# Patient Record
Sex: FEMALE | Race: BLACK OR AFRICAN AMERICAN | Employment: OTHER
[De-identification: names, ages, dates, MRNs, and addresses within clinical notes are randomized per-mention and may not be internally consistent; named-entity substitution may affect disease eponyms.]

---

## 2017-01-01 DIAGNOSIS — I10 ESSENTIAL HYPERTENSION: ICD-10-CM

## 2017-01-01 RX ORDER — FERROUS SULFATE 325(65) MG
TABLET ORAL
Qty: 60 TABLET | Refills: 0 | Status: SHIPPED | OUTPATIENT
Start: 2017-01-01 | End: 2018-01-01 | Stop reason: SDUPTHER

## 2017-01-01 RX ORDER — AMLODIPINE BESYLATE 5 MG/1
TABLET ORAL
Qty: 30 TABLET | Refills: 0 | Status: SHIPPED | OUTPATIENT
Start: 2017-01-01 | End: 2018-01-01 | Stop reason: SDUPTHER

## 2017-01-13 DIAGNOSIS — I25.119 CORONARY ARTERY DISEASE WITH ANGINA PECTORIS, UNSPECIFIED VESSEL OR LESION TYPE, UNSPECIFIED WHETHER NATIVE OR TRANSPLANTED HEART (HCC): ICD-10-CM

## 2017-01-13 DIAGNOSIS — I10 ESSENTIAL HYPERTENSION: ICD-10-CM

## 2017-01-13 RX ORDER — CLOPIDOGREL BISULFATE 75 MG/1
TABLET ORAL
Qty: 30 TABLET | Refills: 5 | Status: CANCELLED | OUTPATIENT
Start: 2017-01-13

## 2017-01-13 RX ORDER — ASPIRIN 81 MG/1
TABLET ORAL
Qty: 30 TABLET | Refills: 11 | Status: CANCELLED | OUTPATIENT
Start: 2017-01-13

## 2017-01-13 RX ORDER — METOPROLOL TARTRATE 100 MG/1
TABLET ORAL
Qty: 30 TABLET | Refills: 11 | Status: CANCELLED | OUTPATIENT
Start: 2017-01-13

## 2017-02-15 DIAGNOSIS — I25.119 CORONARY ARTERY DISEASE WITH ANGINA PECTORIS, UNSPECIFIED VESSEL OR LESION TYPE, UNSPECIFIED WHETHER NATIVE OR TRANSPLANTED HEART (HCC): ICD-10-CM

## 2017-02-15 DIAGNOSIS — I10 ESSENTIAL HYPERTENSION: ICD-10-CM

## 2017-02-16 RX ORDER — METOPROLOL TARTRATE 100 MG/1
TABLET ORAL
Qty: 30 TABLET | Refills: 11 | Status: SHIPPED | OUTPATIENT
Start: 2017-02-16 | End: 2017-02-20 | Stop reason: SDUPTHER

## 2017-02-20 ENCOUNTER — OFFICE VISIT (OUTPATIENT)
Dept: FAMILY MEDICINE CLINIC | Facility: CLINIC | Age: 67
End: 2017-02-20

## 2017-02-20 VITALS
HEART RATE: 70 BPM | HEIGHT: 66 IN | TEMPERATURE: 97 F | WEIGHT: 209 LBS | SYSTOLIC BLOOD PRESSURE: 110 MMHG | BODY MASS INDEX: 33.59 KG/M2 | DIASTOLIC BLOOD PRESSURE: 70 MMHG

## 2017-02-20 DIAGNOSIS — E66.9 NON MORBID OBESITY, UNSPECIFIED OBESITY TYPE: Primary | ICD-10-CM

## 2017-02-20 DIAGNOSIS — I10 ESSENTIAL HYPERTENSION: ICD-10-CM

## 2017-02-20 DIAGNOSIS — I25.119 CORONARY ARTERY DISEASE WITH ANGINA PECTORIS, UNSPECIFIED VESSEL OR LESION TYPE, UNSPECIFIED WHETHER NATIVE OR TRANSPLANTED HEART (HCC): ICD-10-CM

## 2017-02-20 PROCEDURE — 99213 OFFICE O/P EST LOW 20 MIN: CPT | Performed by: HOSPITALIST

## 2017-02-20 RX ORDER — AMLODIPINE BESYLATE 5 MG/1
5 TABLET ORAL DAILY
Qty: 30 TABLET | Refills: 3 | Status: SHIPPED | OUTPATIENT
Start: 2017-02-20 | End: 2017-06-19 | Stop reason: SDUPTHER

## 2017-02-20 RX ORDER — METOPROLOL TARTRATE 100 MG/1
TABLET ORAL
Qty: 30 TABLET | Refills: 11 | Status: SHIPPED | OUTPATIENT
Start: 2017-02-20 | End: 2018-01-01

## 2017-02-20 RX ORDER — PANTOPRAZOLE SODIUM 20 MG/1
TABLET, DELAYED RELEASE ORAL
Qty: 30 TABLET | Refills: 3 | Status: SHIPPED | OUTPATIENT
Start: 2017-02-20 | End: 2017-03-14 | Stop reason: SDUPTHER

## 2017-02-20 ASSESSMENT — ENCOUNTER SYMPTOMS
SHORTNESS OF BREATH: 0
ANAL BLEEDING: 0
APNEA: 0
BLURRED VISION: 0
ABDOMINAL PAIN: 0
ABDOMINAL DISTENTION: 0
WHEEZING: 0

## 2017-03-14 RX ORDER — PANTOPRAZOLE SODIUM 20 MG/1
TABLET, DELAYED RELEASE ORAL
Qty: 30 TABLET | Refills: 0 | Status: SHIPPED | OUTPATIENT
Start: 2017-03-14 | End: 2017-03-24 | Stop reason: SDUPTHER

## 2017-03-23 ENCOUNTER — TELEPHONE (OUTPATIENT)
Dept: FAMILY MEDICINE CLINIC | Age: 67
End: 2017-03-23

## 2017-03-24 RX ORDER — PANTOPRAZOLE SODIUM 20 MG/1
TABLET, DELAYED RELEASE ORAL
Qty: 30 TABLET | Refills: 0 | Status: SHIPPED | OUTPATIENT
Start: 2017-03-24 | End: 2017-07-27 | Stop reason: SDUPTHER

## 2017-04-10 DIAGNOSIS — I10 ESSENTIAL HYPERTENSION: ICD-10-CM

## 2017-04-10 DIAGNOSIS — E11.9 TYPE 2 DIABETES MELLITUS WITHOUT COMPLICATION, UNSPECIFIED LONG TERM INSULIN USE STATUS: ICD-10-CM

## 2017-04-11 RX ORDER — ATORVASTATIN CALCIUM 40 MG/1
40 TABLET, FILM COATED ORAL NIGHTLY
Qty: 30 TABLET | Refills: 3 | Status: SHIPPED | OUTPATIENT
Start: 2017-04-11 | End: 2017-08-02 | Stop reason: SDUPTHER

## 2017-07-19 RX ORDER — DIPHENOXYLATE HYDROCHLORIDE AND ATROPINE SULFATE 2.5; .025 MG/1; MG/1
TABLET ORAL
Qty: 90 TABLET | Refills: 0 | Status: SHIPPED | OUTPATIENT
Start: 2017-07-19 | End: 2017-10-06 | Stop reason: SDUPTHER

## 2017-08-02 DIAGNOSIS — E11.9 TYPE 2 DIABETES MELLITUS WITHOUT COMPLICATION, UNSPECIFIED LONG TERM INSULIN USE STATUS: ICD-10-CM

## 2017-08-02 DIAGNOSIS — I10 ESSENTIAL HYPERTENSION: ICD-10-CM

## 2017-08-02 RX ORDER — ATORVASTATIN CALCIUM 40 MG/1
TABLET, FILM COATED ORAL
Qty: 30 TABLET | Refills: 0 | Status: SHIPPED | OUTPATIENT
Start: 2017-08-02 | End: 2017-09-06 | Stop reason: SDUPTHER

## 2017-08-16 RX ORDER — PANTOPRAZOLE SODIUM 20 MG/1
TABLET, DELAYED RELEASE ORAL
Qty: 30 TABLET | Refills: 0 | Status: CANCELLED | OUTPATIENT
Start: 2017-08-16

## 2017-08-17 RX ORDER — PANTOPRAZOLE SODIUM 20 MG/1
TABLET, DELAYED RELEASE ORAL
Qty: 30 TABLET | Refills: 0 | Status: CANCELLED | OUTPATIENT
Start: 2017-08-17

## 2017-08-17 RX ORDER — NICOTINE POLACRILEX 4 MG/1
20 GUM, CHEWING ORAL DAILY
Qty: 30 TABLET | Refills: 1 | Status: SHIPPED | OUTPATIENT
Start: 2017-08-17 | End: 2017-10-30 | Stop reason: SDUPTHER

## 2017-08-17 RX ORDER — OMEPRAZOLE 20 MG/1
20 TABLET, DELAYED RELEASE ORAL DAILY
Qty: 30 TABLET | Refills: 1 | Status: SHIPPED | OUTPATIENT
Start: 2017-08-17 | End: 2017-10-13 | Stop reason: SDUPTHER

## 2017-08-25 ENCOUNTER — OFFICE VISIT (OUTPATIENT)
Dept: FAMILY MEDICINE CLINIC | Age: 67
End: 2017-08-25
Payer: COMMERCIAL

## 2017-08-25 VITALS
SYSTOLIC BLOOD PRESSURE: 125 MMHG | DIASTOLIC BLOOD PRESSURE: 73 MMHG | HEART RATE: 68 BPM | HEIGHT: 66 IN | TEMPERATURE: 97.7 F | WEIGHT: 213.2 LBS | BODY MASS INDEX: 34.27 KG/M2

## 2017-08-25 DIAGNOSIS — I10 ESSENTIAL HYPERTENSION: ICD-10-CM

## 2017-08-25 DIAGNOSIS — E11.9 TYPE 2 DIABETES MELLITUS WITHOUT COMPLICATION, WITHOUT LONG-TERM CURRENT USE OF INSULIN (HCC): Primary | ICD-10-CM

## 2017-08-25 DIAGNOSIS — Z00.00 HEALTHCARE MAINTENANCE: ICD-10-CM

## 2017-08-25 DIAGNOSIS — Z23 ENCOUNTER FOR IMMUNIZATION: ICD-10-CM

## 2017-08-25 PROCEDURE — 90670 PCV13 VACCINE IM: CPT | Performed by: HOSPITALIST

## 2017-08-25 PROCEDURE — 90715 TDAP VACCINE 7 YRS/> IM: CPT | Performed by: HOSPITALIST

## 2017-08-25 PROCEDURE — G0009 ADMIN PNEUMOCOCCAL VACCINE: HCPCS | Performed by: HOSPITALIST

## 2017-08-25 PROCEDURE — 99213 OFFICE O/P EST LOW 20 MIN: CPT | Performed by: HOSPITALIST

## 2017-08-25 ASSESSMENT — PATIENT HEALTH QUESTIONNAIRE - PHQ9
1. LITTLE INTEREST OR PLEASURE IN DOING THINGS: 1
SUM OF ALL RESPONSES TO PHQ QUESTIONS 1-9: 2
2. FEELING DOWN, DEPRESSED OR HOPELESS: 1
SUM OF ALL RESPONSES TO PHQ9 QUESTIONS 1 & 2: 2

## 2017-08-25 ASSESSMENT — ENCOUNTER SYMPTOMS
GASTROINTESTINAL NEGATIVE: 1
RESPIRATORY NEGATIVE: 1

## 2017-09-06 DIAGNOSIS — E11.9 TYPE 2 DIABETES MELLITUS WITHOUT COMPLICATION, UNSPECIFIED LONG TERM INSULIN USE STATUS: ICD-10-CM

## 2017-09-06 DIAGNOSIS — I10 ESSENTIAL HYPERTENSION: ICD-10-CM

## 2017-09-07 RX ORDER — ATORVASTATIN CALCIUM 40 MG/1
TABLET, FILM COATED ORAL
Qty: 30 TABLET | Refills: 0 | Status: SHIPPED | OUTPATIENT
Start: 2017-09-07 | End: 2017-10-06 | Stop reason: SDUPTHER

## 2017-09-12 RX ORDER — CLOPIDOGREL BISULFATE 75 MG/1
TABLET ORAL
Qty: 30 TABLET | Refills: 0 | Status: SHIPPED | OUTPATIENT
Start: 2017-09-12 | End: 2017-10-06 | Stop reason: SDUPTHER

## 2017-09-12 RX ORDER — ASPIRIN 81 MG
TABLET, DELAYED RELEASE (ENTERIC COATED) ORAL
Qty: 30 TABLET | Refills: 0 | Status: SHIPPED | OUTPATIENT
Start: 2017-09-12 | End: 2017-10-13 | Stop reason: SDUPTHER

## 2017-09-14 RX ORDER — PANTOPRAZOLE SODIUM 20 MG/1
TABLET, DELAYED RELEASE ORAL
Qty: 30 TABLET | Refills: 0 | Status: CANCELLED | OUTPATIENT
Start: 2017-09-14

## 2017-09-20 ENCOUNTER — HOSPITAL ENCOUNTER (OUTPATIENT)
Age: 67
Discharge: HOME | End: 2017-09-20
Payer: COMMERCIAL

## 2017-09-20 LAB
CHOLESTEROL, FASTING: 156 MG/DL
CHOLESTEROL/HDL RATIO: 3.4
HDLC SERPL-MCNC: 46 MG/DL
LDL CHOLESTEROL: 72 MG/DL (ref 0–130)
TRIGLYCERIDE, FASTING: 189 MG/DL
VLDLC SERPL CALC-MCNC: ABNORMAL MG/DL (ref 1–30)

## 2017-09-20 PROCEDURE — 80061 LIPID PANEL: CPT

## 2017-09-20 PROCEDURE — 36415 COLL VENOUS BLD VENIPUNCTURE: CPT

## 2017-09-20 RX ORDER — LANSOPRAZOLE 15 MG/1
15 CAPSULE, DELAYED RELEASE ORAL DAILY
Qty: 30 CAPSULE | Refills: 3 | Status: SHIPPED | OUTPATIENT
Start: 2017-09-20 | End: 2017-10-30 | Stop reason: DRUGHIGH

## 2017-10-06 DIAGNOSIS — I10 ESSENTIAL HYPERTENSION: ICD-10-CM

## 2017-10-06 DIAGNOSIS — E11.9 TYPE 2 DIABETES MELLITUS WITHOUT COMPLICATION, UNSPECIFIED LONG TERM INSULIN USE STATUS: ICD-10-CM

## 2017-10-09 RX ORDER — CLOPIDOGREL BISULFATE 75 MG/1
TABLET ORAL
Qty: 30 TABLET | Refills: 0 | Status: SHIPPED | OUTPATIENT
Start: 2017-10-09 | End: 2017-10-30 | Stop reason: SDUPTHER

## 2017-10-09 RX ORDER — MULTIVITAMIN WITH FOLIC ACID 400 MCG
TABLET ORAL
Qty: 90 TABLET | Refills: 0 | Status: SHIPPED | OUTPATIENT
Start: 2017-10-09 | End: 2017-10-30 | Stop reason: SDUPTHER

## 2017-10-09 RX ORDER — ATORVASTATIN CALCIUM 40 MG/1
TABLET, FILM COATED ORAL
Qty: 30 TABLET | Refills: 0 | Status: SHIPPED | OUTPATIENT
Start: 2017-10-09 | End: 2017-10-30 | Stop reason: SDUPTHER

## 2017-10-09 RX ORDER — LISINOPRIL 5 MG/1
TABLET ORAL
Qty: 60 TABLET | Refills: 0 | Status: SHIPPED | OUTPATIENT
Start: 2017-10-09 | End: 2017-10-30 | Stop reason: SDUPTHER

## 2017-10-10 RX ORDER — ISOSORBIDE MONONITRATE 30 MG/1
TABLET, EXTENDED RELEASE ORAL
Qty: 30 TABLET | Refills: 0 | Status: SHIPPED | OUTPATIENT
Start: 2017-10-10 | End: 2017-10-30 | Stop reason: SDUPTHER

## 2017-10-13 DIAGNOSIS — I10 ESSENTIAL HYPERTENSION: ICD-10-CM

## 2017-10-13 NOTE — TELEPHONE ENCOUNTER
Please review and address medication refill , if i can be an assistance be route to acceptable pool. Thank you. Next Visit Date:  Future Appointments  Date Time Provider Mikal Domingo   10/30/2017 2:15 PM Eleno Ghosh MD [address] [place] [address] Maintenance   Topic Date Due    Diabetic retinal exam  04/13/2016    Diabetic foot exam  11/25/2016    Flu vaccine (1) 09/01/2017    Diabetic hemoglobin A1C test  10/07/2017    Colon Cancer Screen FIT/FOBT  10/18/2017    Breast cancer screen  12/09/2017    Lipid screen  09/20/2018    DTaP/Tdap/Td vaccine (2 - Td) 08/25/2027    Zostavax vaccine  Completed    DEXA (modify frequency per FRAX score)  Completed    Pneumococcal low/med risk  Completed    Hepatitis C screen  Completed       Hemoglobin A1C (%)   Date Value   10/07/2016 5.8   11/25/2015 5.6   06/23/2015 5.3             ( goal A1C is < 7)   Microalb/Crt.  Ratio (mcg/mg creat)   Date Value   04/22/2016 19     LDL Cholesterol (mg/dL)   Date Value   09/20/2017 72       (goal LDL is <100)   AST (U/L)   Date Value   02/21/2013 16     ALT (U/L)   Date Value   02/21/2013 17     BUN (mg/dL)   Date Value   10/15/2015 14     BP Readings from Last 3 Encounters:   08/25/17 125/73   02/20/17 110/70   10/07/16 122/75          (goal 120/80)    All Future Testing planned in [facility]  Lab Frequency Next Occurrence               Patient Active Problem List:     Type 2 diabetes mellitus (HCC)     Asthma     CAD (coronary artery disease)     Hyperlipemia     HTN (hypertension)     Anemia     Schizophrenia, paranoid (HCC)     Tobacco abuse     CKD (chronic kidney disease) stage 3, GFR 30-59 ml/min     Concussion     Cranial nerve VI palsy     Headache     S/P CABG (coronary artery bypass graft)     Screening for condition     GERD (gastroesophageal reflux disease)     Diabetes mellitus, type 2 ([facility]

## 2017-10-16 RX ORDER — ASPIRIN 81 MG/1
TABLET ORAL
Qty: 30 TABLET | Refills: 0 | Status: SHIPPED | OUTPATIENT
Start: 2017-10-16 | End: 2017-10-30 | Stop reason: SDUPTHER

## 2017-10-16 RX ORDER — NICOTINE POLACRILEX 4 MG/1
GUM, CHEWING ORAL
Qty: 30 TABLET | Refills: 0 | Status: SHIPPED | OUTPATIENT
Start: 2017-10-16 | End: 2017-11-15 | Stop reason: SDUPTHER

## 2017-10-16 RX ORDER — AMLODIPINE BESYLATE 5 MG/1
TABLET ORAL
Qty: 30 TABLET | Refills: 0 | Status: SHIPPED | OUTPATIENT
Start: 2017-10-16 | End: 2017-11-08 | Stop reason: SDUPTHER

## 2017-10-30 ENCOUNTER — OFFICE VISIT (OUTPATIENT)
Dept: FAMILY MEDICINE CLINIC | Age: 67
End: 2017-10-30
Payer: COMMERCIAL

## 2017-10-30 VITALS
HEIGHT: 66 IN | HEART RATE: 82 BPM | WEIGHT: 218 LBS | SYSTOLIC BLOOD PRESSURE: 134 MMHG | DIASTOLIC BLOOD PRESSURE: 78 MMHG | TEMPERATURE: 98.2 F | BODY MASS INDEX: 35.03 KG/M2

## 2017-10-30 DIAGNOSIS — Z76.0 MEDICATION REFILL: Primary | ICD-10-CM

## 2017-10-30 DIAGNOSIS — I10 ESSENTIAL HYPERTENSION: ICD-10-CM

## 2017-10-30 DIAGNOSIS — Z23 ENCOUNTER FOR IMMUNIZATION: ICD-10-CM

## 2017-10-30 DIAGNOSIS — E11.9 TYPE 2 DIABETES MELLITUS WITHOUT COMPLICATION, UNSPECIFIED LONG TERM INSULIN USE STATUS: ICD-10-CM

## 2017-10-30 DIAGNOSIS — J32.9 CHRONIC CONGESTION OF PARANASAL SINUS: ICD-10-CM

## 2017-10-30 LAB — HBA1C MFR BLD: 5.7 %

## 2017-10-30 PROCEDURE — 4040F PNEUMOC VAC/ADMIN/RCVD: CPT | Performed by: FAMILY MEDICINE

## 2017-10-30 PROCEDURE — G8427 DOCREV CUR MEDS BY ELIG CLIN: HCPCS | Performed by: FAMILY MEDICINE

## 2017-10-30 PROCEDURE — 3017F COLORECTAL CA SCREEN DOC REV: CPT | Performed by: FAMILY MEDICINE

## 2017-10-30 PROCEDURE — G8598 ASA/ANTIPLAT THER USED: HCPCS | Performed by: FAMILY MEDICINE

## 2017-10-30 PROCEDURE — 99213 OFFICE O/P EST LOW 20 MIN: CPT | Performed by: FAMILY MEDICINE

## 2017-10-30 PROCEDURE — 1090F PRES/ABSN URINE INCON ASSESS: CPT | Performed by: FAMILY MEDICINE

## 2017-10-30 PROCEDURE — G0008 ADMIN INFLUENZA VIRUS VAC: HCPCS | Performed by: FAMILY MEDICINE

## 2017-10-30 PROCEDURE — 3014F SCREEN MAMMO DOC REV: CPT | Performed by: FAMILY MEDICINE

## 2017-10-30 PROCEDURE — 1036F TOBACCO NON-USER: CPT | Performed by: FAMILY MEDICINE

## 2017-10-30 PROCEDURE — G8399 PT W/DXA RESULTS DOCUMENT: HCPCS | Performed by: FAMILY MEDICINE

## 2017-10-30 PROCEDURE — 1123F ACP DISCUSS/DSCN MKR DOCD: CPT | Performed by: FAMILY MEDICINE

## 2017-10-30 PROCEDURE — 90688 IIV4 VACCINE SPLT 0.5 ML IM: CPT | Performed by: FAMILY MEDICINE

## 2017-10-30 PROCEDURE — G8484 FLU IMMUNIZE NO ADMIN: HCPCS | Performed by: FAMILY MEDICINE

## 2017-10-30 PROCEDURE — G8417 CALC BMI ABV UP PARAM F/U: HCPCS | Performed by: FAMILY MEDICINE

## 2017-10-30 PROCEDURE — 3044F HG A1C LEVEL LT 7.0%: CPT | Performed by: FAMILY MEDICINE

## 2017-10-30 PROCEDURE — 83036 HEMOGLOBIN GLYCOSYLATED A1C: CPT | Performed by: FAMILY MEDICINE

## 2017-10-30 RX ORDER — NITROGLYCERIN 0.4 MG/1
0.4 TABLET SUBLINGUAL EVERY 5 MIN PRN
Qty: 25 TABLET | Refills: 3 | Status: SHIPPED | OUTPATIENT
Start: 2017-10-30

## 2017-10-30 RX ORDER — ISOSORBIDE MONONITRATE 30 MG/1
TABLET, EXTENDED RELEASE ORAL
Qty: 30 TABLET | Refills: 1 | Status: SHIPPED | OUTPATIENT
Start: 2017-10-30 | End: 2018-01-01 | Stop reason: SDUPTHER

## 2017-10-30 RX ORDER — ECHINACEA PURPUREA EXTRACT 125 MG
1 TABLET ORAL PRN
Qty: 1 BOTTLE | Refills: 3 | Status: SHIPPED | OUTPATIENT
Start: 2017-10-30

## 2017-10-30 RX ORDER — NICOTINE POLACRILEX 4 MG/1
20 GUM, CHEWING ORAL DAILY
Qty: 30 TABLET | Refills: 1 | Status: SHIPPED | OUTPATIENT
Start: 2017-10-30 | End: 2018-01-01 | Stop reason: SDUPTHER

## 2017-10-30 RX ORDER — ASPIRIN 81 MG/1
TABLET ORAL
Qty: 30 TABLET | Refills: 2 | Status: SHIPPED | OUTPATIENT
Start: 2017-10-30 | End: 2017-11-08 | Stop reason: ALTCHOICE

## 2017-10-30 RX ORDER — ATORVASTATIN CALCIUM 40 MG/1
40 TABLET, FILM COATED ORAL DAILY
Qty: 30 TABLET | Refills: 2 | Status: SHIPPED | OUTPATIENT
Start: 2017-10-30 | End: 2018-01-01 | Stop reason: SDUPTHER

## 2017-10-30 RX ORDER — THIAMINE MONONITRATE (VIT B1) 100 MG
100 TABLET ORAL DAILY
Qty: 30 TABLET | Refills: 1 | Status: SHIPPED | OUTPATIENT
Start: 2017-10-30 | End: 2018-01-01 | Stop reason: SDUPTHER

## 2017-10-30 RX ORDER — LISINOPRIL 5 MG/1
TABLET ORAL
Qty: 60 TABLET | Refills: 2 | Status: SHIPPED | OUTPATIENT
Start: 2017-10-30 | End: 2017-11-08 | Stop reason: ALTCHOICE

## 2017-10-30 RX ORDER — BENZTROPINE MESYLATE 0.5 MG/1
0.5 TABLET ORAL DAILY
Qty: 60 TABLET | Refills: 2 | Status: SHIPPED | OUTPATIENT
Start: 2017-10-30

## 2017-10-30 RX ORDER — FERROUS SULFATE 325(65) MG
TABLET ORAL
Qty: 60 TABLET | Refills: 3 | Status: SHIPPED | OUTPATIENT
Start: 2017-10-30 | End: 2017-01-01

## 2017-10-30 RX ORDER — MULTIVITAMIN WITH FOLIC ACID 400 MCG
TABLET ORAL
Qty: 90 TABLET | Refills: 0 | Status: SHIPPED | OUTPATIENT
Start: 2017-10-30 | End: 2018-01-01

## 2017-10-30 RX ORDER — MULTIVIT-MIN/IRON/FOLIC ACID/K 18-600-40
2 CAPSULE ORAL DAILY
Qty: 30 TABLET | Refills: 3 | Status: SHIPPED | OUTPATIENT
Start: 2017-10-30

## 2017-10-30 RX ORDER — CLOPIDOGREL BISULFATE 75 MG/1
TABLET ORAL
Qty: 30 TABLET | Refills: 1 | Status: SHIPPED | OUTPATIENT
Start: 2017-10-30 | End: 2017-11-08 | Stop reason: ALTCHOICE

## 2017-10-30 RX ORDER — LANOLIN ALCOHOL/MO/W.PET/CERES
3 CREAM (GRAM) TOPICAL DAILY
Qty: 30 TABLET | Refills: 1 | Status: SHIPPED | OUTPATIENT
Start: 2017-10-30

## 2017-10-30 ASSESSMENT — ENCOUNTER SYMPTOMS
RESPIRATORY NEGATIVE: 1
CHEST TIGHTNESS: 0
ABDOMINAL DISTENTION: 0
GASTROINTESTINAL NEGATIVE: 1
ABDOMINAL PAIN: 0
APNEA: 0

## 2017-10-30 NOTE — PROGRESS NOTES
Subjective:    Marnie Shahid is a 69 y.o. female with  has a past medical history of Acute kidney failure, unspecified; Asthma; CAD (coronary artery disease); Chronic kidney disease, stage III (moderate); Cranial nerve VI palsy; GERD (gastroesophageal reflux disease); Headache(784.0); Hyperlipidemia; Hypertension; Tobacco abuse; and Type II or unspecified type diabetes mellitus without mention of complication, not stated as uncontrolled. Presented to the office today for:  Chief Complaint   Patient presents with    Diabetes     Pt states she does keep track of BS sometimes last reading was done last week however does not remember what her numbers were        HPI     Pt here for regular follow up. Has HTn and diabetes. HTN is well controlled with meds, denies chest pain SOB or vision changes. No htn urgency or emergency s/s. DM is well controlled with diet, her A1C today is 5.7  No other complains for today. Good appetite and BM. Review of Systems   Constitutional: Negative. HENT: Negative. Negative for congestion and dental problem. Respiratory: Negative. Negative for apnea and chest tightness. Cardiovascular: Negative. Gastrointestinal: Negative. Negative for abdominal distention and abdominal pain. Genitourinary: Negative. Negative for difficulty urinating. Neurological: Negative. Objective:      /78 (Site: Left Arm, Position: Sitting, Cuff Size: Medium Adult)   Pulse 82   Temp 98.2 °F (36.8 °C) (Temporal)   Ht 5' 6.14\" (1.68 m)   Wt 218 lb (98.9 kg)   BMI 35.04 kg/m²    Physical Exam   Constitutional: She appears well-developed and well-nourished. Cardiovascular: Normal rate, regular rhythm and normal heart sounds. No murmur heard. Pulmonary/Chest: Effort normal and breath sounds normal. She has no wheezes. Abdominal: Soft. She exhibits no distension. There is no tenderness. Lymphadenopathy:     She has no cervical adenopathy.          Assessment and Plan:      BP Readings from Last 3 Encounters:   10/30/17 134/78   08/25/17 125/73   02/20/17 110/70     /78 (Site: Left Arm, Position: Sitting, Cuff Size: Medium Adult)   Pulse 82   Temp 98.2 °F (36.8 °C) (Temporal)   Ht 5' 6.14\" (1.68 m)   Wt 218 lb (98.9 kg)   BMI 35.04 kg/m²   Lab Results   Component Value Date    WBC 7.2 07/15/2015    HGB 12.8 07/15/2015    HCT 40.5 07/15/2015     07/12/2016    CHOL 143 10/15/2015    TRIG 102 10/15/2015    HDL 46 09/20/2017    ALT 17 02/21/2013    AST 16 02/21/2013     10/15/2015    K 4.5 10/15/2015     10/15/2015    CREATININE 1.0 07/12/2016    BUN 14 10/15/2015    CO2 25 10/15/2015    LABA1C 5.7 10/30/2017    LABMICR 19 04/22/2016     Lab Results   Component Value Date    CALCIUM 9.0 10/15/2015    PHOS 3.6 10/15/2015     Lab Results   Component Value Date    LDLCHOLESTEROL 72 09/20/2017         1. Essential hypertension  - atorvastatin (LIPITOR) 40 MG tablet; Take 1 tablet by mouth daily  Dispense: 30 tablet; Refill: 2  - lisinopril (PRINIVIL;ZESTRIL) 5 MG tablet; TAKE 1 TAB BY MOUTH TWICE A DAY  Dispense: 60 tablet; Refill: 2    2. Type 2 diabetes mellitus without complication, unspecified long term insulin use status (HCC)  A1C today 5.7  - atorvastatin (LIPITOR) 40 MG tablet; Take 1 tablet by mouth daily  Dispense: 30 tablet; Refill: 2  - POCT glycosylated hemoglobin (Hb A1C)    3. Chronic congestion of paranasal sinus  - sodium chloride (ALTAMIST SPRAY) 0.65 % nasal spray; 1 spray by Nasal route as needed for Congestion  Dispense: 1 Bottle; Refill: 3    4. Medication refill  - aspirin 81 MG EC tablet; TAKE 1 TAB BY MOUTH ONCE A DAY  Dispense: 30 tablet; Refill: 2  - atorvastatin (LIPITOR) 40 MG tablet; Take 1 tablet by mouth daily  Dispense: 30 tablet; Refill: 2  - benztropine (COGENTIN) 0.5 MG tablet; Take 1 tablet by mouth daily  Dispense: 60 tablet;  Refill: 2  - Calcium Carb-Cholecalciferol 500-400 MG-UNIT TABS; Take 2 tablets by mouth prescribed medications. Barriers to medication compliance addressed. All patient questions answered. Pt voiced understanding. Return in about 2 months (around 12/30/2017) for HTN.

## 2017-10-30 NOTE — PROGRESS NOTES
I have reviewed and discussed key elements of [address] with the resident including plan of care and follow up and agree with the care gordon plan.

## 2017-11-08 DIAGNOSIS — I10 ESSENTIAL HYPERTENSION: ICD-10-CM

## 2017-11-08 RX ORDER — AMLODIPINE BESYLATE 5 MG/1
TABLET ORAL
Qty: 30 TABLET | Refills: 0 | Status: SHIPPED | OUTPATIENT
Start: 2017-11-08 | End: 2017-01-01 | Stop reason: SDUPTHER

## 2017-11-08 RX ORDER — LISINOPRIL 5 MG/1
TABLET ORAL
Qty: 60 TABLET | Refills: 0 | Status: SHIPPED | OUTPATIENT
Start: 2017-11-08 | End: 2018-01-01

## 2017-11-08 RX ORDER — CLOPIDOGREL BISULFATE 75 MG/1
TABLET ORAL
Qty: 30 TABLET | Refills: 0 | Status: SHIPPED | OUTPATIENT
Start: 2017-11-08 | End: 2018-01-01

## 2017-11-19 RX ORDER — ISOSORBIDE MONONITRATE 30 MG/1
TABLET, EXTENDED RELEASE ORAL
Qty: 30 TABLET | Refills: 0 | Status: SHIPPED | OUTPATIENT
Start: 2017-11-19 | End: 2018-01-01

## 2017-11-19 RX ORDER — OMEPRAZOLE 20 MG/1
CAPSULE, DELAYED RELEASE ORAL
Qty: 30 CAPSULE | Refills: 1 | Status: SHIPPED | OUTPATIENT
Start: 2017-11-19 | End: 2018-01-01

## 2018-01-01 ENCOUNTER — TELEPHONE (OUTPATIENT)
Dept: FAMILY MEDICINE CLINIC | Age: 68
End: 2018-01-01

## 2018-01-01 ENCOUNTER — APPOINTMENT (OUTPATIENT)
Dept: GENERAL RADIOLOGY | Age: 68
DRG: 207 | End: 2018-01-01
Payer: COMMERCIAL

## 2018-01-01 ENCOUNTER — OFFICE VISIT (OUTPATIENT)
Dept: FAMILY MEDICINE CLINIC | Age: 68
End: 2018-01-01
Payer: COMMERCIAL

## 2018-01-01 ENCOUNTER — APPOINTMENT (OUTPATIENT)
Dept: CT IMAGING | Age: 68
DRG: 207 | End: 2018-01-01
Payer: COMMERCIAL

## 2018-01-01 ENCOUNTER — HOSPITAL ENCOUNTER (OUTPATIENT)
Dept: MAMMOGRAPHY | Age: 68
Discharge: HOME | End: 2018-02-28
Payer: COMMERCIAL

## 2018-01-01 ENCOUNTER — HOSPITAL ENCOUNTER (INPATIENT)
Age: 68
LOS: 5 days | DRG: 207 | End: 2018-12-07
Attending: EMERGENCY MEDICINE | Admitting: INTERNAL MEDICINE
Payer: COMMERCIAL

## 2018-01-01 ENCOUNTER — HOSPITAL ENCOUNTER (OUTPATIENT)
Age: 68
Setting detail: SPECIMEN
Discharge: HOME | End: 2018-01-10
Payer: COMMERCIAL

## 2018-01-01 ENCOUNTER — HOSPITAL ENCOUNTER (OUTPATIENT)
Age: 68
Discharge: HOME | End: 2018-11-12
Payer: COMMERCIAL

## 2018-01-01 ENCOUNTER — TELEPHONE (OUTPATIENT)
Dept: ADMINISTRATIVE | Age: 68
End: 2018-01-01

## 2018-01-01 ENCOUNTER — NURSE ONLY (OUTPATIENT)
Dept: FAMILY MEDICINE CLINIC | Age: 68
End: 2018-01-01
Payer: COMMERCIAL

## 2018-01-01 ENCOUNTER — OFFICE VISIT (OUTPATIENT)
Dept: SURGERY | Age: 68
End: 2018-01-01
Payer: COMMERCIAL

## 2018-01-01 ENCOUNTER — HOSPITAL ENCOUNTER (OUTPATIENT)
Age: 68
Discharge: HOME | End: 2018-05-01
Payer: COMMERCIAL

## 2018-01-01 VITALS
HEIGHT: 66 IN | HEART RATE: 84 BPM | WEIGHT: 218 LBS | TEMPERATURE: 100.3 F | DIASTOLIC BLOOD PRESSURE: 79 MMHG | BODY MASS INDEX: 35.03 KG/M2 | SYSTOLIC BLOOD PRESSURE: 121 MMHG

## 2018-01-01 VITALS
WEIGHT: 216.8 LBS | SYSTOLIC BLOOD PRESSURE: 130 MMHG | HEIGHT: 66 IN | BODY MASS INDEX: 34.84 KG/M2 | TEMPERATURE: 98.3 F | DIASTOLIC BLOOD PRESSURE: 82 MMHG | HEART RATE: 67 BPM

## 2018-01-01 VITALS
HEART RATE: 68 BPM | BODY MASS INDEX: 34.07 KG/M2 | SYSTOLIC BLOOD PRESSURE: 137 MMHG | WEIGHT: 212 LBS | DIASTOLIC BLOOD PRESSURE: 75 MMHG | HEIGHT: 66 IN

## 2018-01-01 VITALS
HEIGHT: 66 IN | WEIGHT: 242.73 LBS | HEART RATE: 54 BPM | SYSTOLIC BLOOD PRESSURE: 49 MMHG | DIASTOLIC BLOOD PRESSURE: 38 MMHG | BODY MASS INDEX: 39.01 KG/M2 | RESPIRATION RATE: 22 BRPM | TEMPERATURE: 99.1 F | OXYGEN SATURATION: 84 %

## 2018-01-01 VITALS
HEART RATE: 87 BPM | SYSTOLIC BLOOD PRESSURE: 134 MMHG | HEIGHT: 66 IN | BODY MASS INDEX: 34.87 KG/M2 | TEMPERATURE: 98.9 F | WEIGHT: 217 LBS | DIASTOLIC BLOOD PRESSURE: 80 MMHG

## 2018-01-01 VITALS
BODY MASS INDEX: 34.72 KG/M2 | WEIGHT: 216 LBS | HEART RATE: 71 BPM | HEIGHT: 66 IN | DIASTOLIC BLOOD PRESSURE: 72 MMHG | SYSTOLIC BLOOD PRESSURE: 124 MMHG | TEMPERATURE: 98.5 F

## 2018-01-01 DIAGNOSIS — I10 ESSENTIAL HYPERTENSION: Primary | ICD-10-CM

## 2018-01-01 DIAGNOSIS — Z76.0 MEDICATION REFILL: ICD-10-CM

## 2018-01-01 DIAGNOSIS — Z00.00 HEALTHCARE MAINTENANCE: ICD-10-CM

## 2018-01-01 DIAGNOSIS — I10 ESSENTIAL HYPERTENSION: ICD-10-CM

## 2018-01-01 DIAGNOSIS — I25.119 CORONARY ARTERY DISEASE WITH ANGINA PECTORIS, UNSPECIFIED VESSEL OR LESION TYPE, UNSPECIFIED WHETHER NATIVE OR TRANSPLANTED HEART (HCC): ICD-10-CM

## 2018-01-01 DIAGNOSIS — N17.9 ACUTE KIDNEY INJURY (HCC): ICD-10-CM

## 2018-01-01 DIAGNOSIS — E11.9 TYPE 2 DIABETES MELLITUS WITHOUT COMPLICATION, UNSPECIFIED LONG TERM INSULIN USE STATUS: ICD-10-CM

## 2018-01-01 DIAGNOSIS — R19.5 POSITIVE FIT (FECAL IMMUNOCHEMICAL TEST): Primary | ICD-10-CM

## 2018-01-01 DIAGNOSIS — G25.0 ESSENTIAL TREMOR: ICD-10-CM

## 2018-01-01 DIAGNOSIS — Z80.0 FAMILY HISTORY OF COLON CANCER: ICD-10-CM

## 2018-01-01 DIAGNOSIS — Z12.31 ENCOUNTER FOR SCREENING MAMMOGRAM FOR MALIGNANT NEOPLASM OF BREAST: ICD-10-CM

## 2018-01-01 DIAGNOSIS — J44.1 COPD EXACERBATION (HCC): ICD-10-CM

## 2018-01-01 DIAGNOSIS — J96.01 ACUTE RESPIRATORY FAILURE WITH HYPOXIA AND HYPERCAPNIA (HCC): ICD-10-CM

## 2018-01-01 DIAGNOSIS — R79.89 ELEVATED LACTIC ACID LEVEL: ICD-10-CM

## 2018-01-01 DIAGNOSIS — M19.90 CHRONIC OSTEOARTHRITIS: ICD-10-CM

## 2018-01-01 DIAGNOSIS — K02.9 TOOTH DECAY: Primary | ICD-10-CM

## 2018-01-01 DIAGNOSIS — Z23 NEED FOR INFLUENZA VACCINATION: Primary | ICD-10-CM

## 2018-01-01 DIAGNOSIS — J96.02 ACUTE RESPIRATORY FAILURE WITH HYPOXIA AND HYPERCAPNIA (HCC): ICD-10-CM

## 2018-01-01 DIAGNOSIS — I46.9 CARDIAC ARREST (HCC): Primary | ICD-10-CM

## 2018-01-01 DIAGNOSIS — E83.39 SERUM PHOSPHATE ELEVATED: ICD-10-CM

## 2018-01-01 DIAGNOSIS — K08.89 PAIN, DENTAL: Primary | ICD-10-CM

## 2018-01-01 LAB
-: ABNORMAL
-: NORMAL
ABSOLUTE EOS #: 0 K/UL (ref 0–0.4)
ABSOLUTE EOS #: 0 K/UL (ref 0–0.44)
ABSOLUTE EOS #: 0.11 K/UL (ref 0–0.44)
ABSOLUTE IMMATURE GRANULOCYTE: 0 K/UL (ref 0–0.3)
ABSOLUTE IMMATURE GRANULOCYTE: 0 K/UL (ref 0–0.3)
ABSOLUTE IMMATURE GRANULOCYTE: 0.03 K/UL (ref 0–0.3)
ABSOLUTE IMMATURE GRANULOCYTE: 0.14 K/UL (ref 0–0.3)
ABSOLUTE IMMATURE GRANULOCYTE: 0.18 K/UL (ref 0–0.3)
ABSOLUTE IMMATURE GRANULOCYTE: 0.38 K/UL (ref 0–0.3)
ABSOLUTE IMMATURE GRANULOCYTE: 1.11 K/UL (ref 0–0.3)
ABSOLUTE LYMPH #: 0.39 K/UL (ref 1–4.8)
ABSOLUTE LYMPH #: 0.53 K/UL (ref 1.1–3.7)
ABSOLUTE LYMPH #: 0.75 K/UL (ref 1–4.8)
ABSOLUTE LYMPH #: 0.88 K/UL (ref 1–4.8)
ABSOLUTE LYMPH #: 0.98 K/UL (ref 1–4.8)
ABSOLUTE LYMPH #: 1.5 K/UL (ref 1.1–3.7)
ABSOLUTE LYMPH #: 3.1 K/UL (ref 1–4.8)
ABSOLUTE MONO #: 0.39 K/UL (ref 0.1–0.8)
ABSOLUTE MONO #: 0.68 K/UL (ref 0.1–1.2)
ABSOLUTE MONO #: 0.78 K/UL (ref 0.1–0.8)
ABSOLUTE MONO #: 1.06 K/UL (ref 0.1–1.2)
ABSOLUTE MONO #: 1.13 K/UL (ref 0.1–0.8)
ABSOLUTE MONO #: 1.55 K/UL (ref 0.1–0.8)
ABSOLUTE MONO #: 1.69 K/UL (ref 0.1–0.8)
ACETAMINOPHEN LEVEL: <5 UG/ML (ref 10–30)
ALBUMIN SERPL-MCNC: 3.2 G/DL (ref 3.5–5.2)
ALBUMIN SERPL-MCNC: 3.8 G/DL (ref 3.5–5.2)
ALBUMIN/GLOBULIN RATIO: 1.1 (ref 1–2.5)
ALBUMIN/GLOBULIN RATIO: 1.1 (ref 1–2.5)
ALLEN TEST: ABNORMAL
ALLEN TEST: POSITIVE
ALP BLD-CCNC: 87 U/L (ref 35–104)
ALP BLD-CCNC: 91 U/L (ref 35–104)
ALT SERPL-CCNC: 11 U/L (ref 5–33)
ALT SERPL-CCNC: 55 U/L (ref 5–33)
AMMONIA: 85 UMOL/L (ref 11–51)
AMORPHOUS: ABNORMAL
AMPHETAMINE SCREEN URINE: NEGATIVE
ANION GAP SERPL CALCULATED.3IONS-SCNC: 12 MMOL/L (ref 9–17)
ANION GAP SERPL CALCULATED.3IONS-SCNC: 13 MMOL/L (ref 9–17)
ANION GAP SERPL CALCULATED.3IONS-SCNC: 14 MMOL/L (ref 9–17)
ANION GAP SERPL CALCULATED.3IONS-SCNC: 15 MMOL/L (ref 9–17)
ANION GAP SERPL CALCULATED.3IONS-SCNC: 17 MMOL/L (ref 9–17)
ANION GAP SERPL CALCULATED.3IONS-SCNC: 17 MMOL/L (ref 9–17)
ANION GAP SERPL CALCULATED.3IONS-SCNC: 22 MMOL/L (ref 9–17)
ANION GAP: 11 MMOL/L (ref 7–16)
AST SERPL-CCNC: 14 U/L
AST SERPL-CCNC: 59 U/L
BACTERIA: ABNORMAL
BARBITURATE SCREEN URINE: NEGATIVE
BASOPHILS # BLD: 0 % (ref 0–2)
BASOPHILS ABSOLUTE: 0 K/UL (ref 0–0.2)
BASOPHILS ABSOLUTE: 0.03 K/UL (ref 0–0.2)
BENZODIAZEPINE SCREEN, URINE: NEGATIVE
BETA-HYDROXYBUTYRATE: 0.16 MMOL/L (ref 0.02–0.27)
BILIRUB SERPL-MCNC: 0.25 MG/DL (ref 0.3–1.2)
BILIRUB SERPL-MCNC: 0.46 MG/DL (ref 0.3–1.2)
BILIRUBIN DIRECT: 0.1 MG/DL
BILIRUBIN URINE: NEGATIVE
BILIRUBIN, INDIRECT: 0.15 MG/DL (ref 0–1)
BUN BLDV-MCNC: 12 MG/DL (ref 8–23)
BUN BLDV-MCNC: 15 MG/DL (ref 8–23)
BUN BLDV-MCNC: 17 MG/DL (ref 8–23)
BUN BLDV-MCNC: 20 MG/DL (ref 8–23)
BUN BLDV-MCNC: 21 MG/DL (ref 8–23)
BUN BLDV-MCNC: 25 MG/DL (ref 8–23)
BUN BLDV-MCNC: 28 MG/DL (ref 8–23)
BUN BLDV-MCNC: 38 MG/DL (ref 8–23)
BUN BLDV-MCNC: 49 MG/DL (ref 8–23)
BUN/CREAT BLD: ABNORMAL (ref 9–20)
BUN/CREAT BLD: NORMAL (ref 9–20)
BUPRENORPHINE URINE: NORMAL
CALCIUM IONIZED: 1.07 MMOL/L (ref 1.13–1.33)
CALCIUM IONIZED: 1.1 MMOL/L (ref 1.13–1.33)
CALCIUM IONIZED: 1.13 MMOL/L (ref 1.13–1.33)
CALCIUM IONIZED: 1.15 MMOL/L (ref 1.13–1.33)
CALCIUM IONIZED: 1.16 MMOL/L (ref 1.13–1.33)
CALCIUM SERPL-MCNC: 7.7 MG/DL (ref 8.6–10.4)
CALCIUM SERPL-MCNC: 7.8 MG/DL (ref 8.6–10.4)
CALCIUM SERPL-MCNC: 8 MG/DL (ref 8.6–10.4)
CALCIUM SERPL-MCNC: 8 MG/DL (ref 8.6–10.4)
CALCIUM SERPL-MCNC: 8.2 MG/DL (ref 8.6–10.4)
CALCIUM SERPL-MCNC: 8.6 MG/DL (ref 8.6–10.4)
CALCIUM SERPL-MCNC: 8.8 MG/DL (ref 8.6–10.4)
CALCIUM SERPL-MCNC: 8.9 MG/DL (ref 8.6–10.4)
CALCIUM SERPL-MCNC: 9.1 MG/DL (ref 8.6–10.4)
CANNABINOID SCREEN URINE: NEGATIVE
CASTS UA: ABNORMAL /LPF (ref 0–2)
CHLORIDE BLD-SCNC: 103 MMOL/L (ref 98–107)
CHLORIDE BLD-SCNC: 109 MMOL/L (ref 98–107)
CHLORIDE BLD-SCNC: 109 MMOL/L (ref 98–107)
CHLORIDE BLD-SCNC: 111 MMOL/L (ref 98–107)
CHLORIDE BLD-SCNC: 111 MMOL/L (ref 98–107)
CHLORIDE BLD-SCNC: 112 MMOL/L (ref 98–107)
CHLORIDE BLD-SCNC: 112 MMOL/L (ref 98–107)
CHLORIDE BLD-SCNC: 114 MMOL/L (ref 98–107)
CHLORIDE BLD-SCNC: 116 MMOL/L (ref 98–107)
CHOLESTEROL, FASTING: 123 MG/DL
CHOLESTEROL/HDL RATIO: 3
CO2: 16 MMOL/L (ref 20–31)
CO2: 17 MMOL/L (ref 20–31)
CO2: 18 MMOL/L (ref 20–31)
CO2: 19 MMOL/L (ref 20–31)
CO2: 19 MMOL/L (ref 20–31)
CO2: 23 MMOL/L (ref 20–31)
CO2: 25 MMOL/L (ref 20–31)
COCAINE METABOLITE, URINE: NEGATIVE
COLOR: YELLOW
COMMENT UA: ABNORMAL
CONTROL: PRESENT
CREAT SERPL-MCNC: 0.88 MG/DL (ref 0.5–0.9)
CREAT SERPL-MCNC: 0.9 MG/DL (ref 0.5–0.9)
CREAT SERPL-MCNC: 1.39 MG/DL (ref 0.5–0.9)
CREAT SERPL-MCNC: 1.6 MG/DL (ref 0.5–0.9)
CREAT SERPL-MCNC: 1.88 MG/DL (ref 0.5–0.9)
CREAT SERPL-MCNC: 1.91 MG/DL (ref 0.5–0.9)
CREAT SERPL-MCNC: 2.09 MG/DL (ref 0.5–0.9)
CREAT SERPL-MCNC: 2.27 MG/DL (ref 0.5–0.9)
CREAT SERPL-MCNC: 2.28 MG/DL (ref 0.5–0.9)
CRYSTALS, UA: ABNORMAL /HPF
CULTURE: ABNORMAL
DIFFERENTIAL TYPE: ABNORMAL
EKG ATRIAL RATE: 109 BPM
EKG ATRIAL RATE: 250 BPM
EKG ATRIAL RATE: 63 BPM
EKG ATRIAL RATE: 81 BPM
EKG P AXIS: 44 DEGREES
EKG P AXIS: 65 DEGREES
EKG P AXIS: 72 DEGREES
EKG P-R INTERVAL: 152 MS
EKG P-R INTERVAL: 170 MS
EKG P-R INTERVAL: 180 MS
EKG Q-T INTERVAL: 384 MS
EKG Q-T INTERVAL: 388 MS
EKG Q-T INTERVAL: 400 MS
EKG Q-T INTERVAL: 446 MS
EKG QRS DURATION: 130 MS
EKG QRS DURATION: 140 MS
EKG QRS DURATION: 144 MS
EKG QRS DURATION: 158 MS
EKG QTC CALCULATION (BAZETT): 376 MS
EKG QTC CALCULATION (BAZETT): 409 MS
EKG QTC CALCULATION (BAZETT): 518 MS
EKG QTC CALCULATION (BAZETT): 522 MS
EKG R AXIS: -104 DEGREES
EKG R AXIS: -91 DEGREES
EKG R AXIS: -95 DEGREES
EKG R AXIS: -95 DEGREES
EKG T AXIS: 137 DEGREES
EKG T AXIS: 33 DEGREES
EKG T AXIS: 57 DEGREES
EKG T AXIS: 63 DEGREES
EKG VENTRICULAR RATE: 109 BPM
EKG VENTRICULAR RATE: 58 BPM
EKG VENTRICULAR RATE: 63 BPM
EKG VENTRICULAR RATE: 81 BPM
EOSINOPHILS RELATIVE PERCENT: 0 % (ref 1–4)
EOSINOPHILS RELATIVE PERCENT: 2 % (ref 1–4)
EPITHELIAL CELLS UA: ABNORMAL /HPF (ref 0–5)
ESTIMATED AVERAGE GLUCOSE: 117 MG/DL
ESTIMATED AVERAGE GLUCOSE: 123 MG/DL
ETHANOL PERCENT: <0.01 %
ETHANOL: <10 MG/DL
FIO2: 100
FIO2: 65
FIO2: 70
FIO2: 80
FIO2: 80
FIO2: 90
FIO2: ABNORMAL
GFR AFRICAN AMERICAN: 26 ML/MIN
GFR AFRICAN AMERICAN: 26 ML/MIN
GFR AFRICAN AMERICAN: 28 ML/MIN
GFR AFRICAN AMERICAN: 31 ML/MIN
GFR AFRICAN AMERICAN: 32 ML/MIN
GFR AFRICAN AMERICAN: 39 ML/MIN
GFR AFRICAN AMERICAN: 45 ML/MIN
GFR AFRICAN AMERICAN: >60 ML/MIN
GFR AFRICAN AMERICAN: >60 ML/MIN
GFR NON-AFRICAN AMERICAN: 21 ML/MIN
GFR NON-AFRICAN AMERICAN: 21 ML/MIN
GFR NON-AFRICAN AMERICAN: 23 ML/MIN
GFR NON-AFRICAN AMERICAN: 26 ML/MIN
GFR NON-AFRICAN AMERICAN: 26 ML/MIN
GFR NON-AFRICAN AMERICAN: 32 ML/MIN
GFR NON-AFRICAN AMERICAN: 37 ML/MIN
GFR NON-AFRICAN AMERICAN: 48 ML/MIN
GFR NON-AFRICAN AMERICAN: >60 ML/MIN
GFR NON-AFRICAN AMERICAN: >60 ML/MIN
GFR SERPL CREATININE-BSD FRML MDRD: 58 ML/MIN
GFR SERPL CREATININE-BSD FRML MDRD: ABNORMAL ML/MIN/{1.73_M2}
GFR SERPL CREATININE-BSD FRML MDRD: NORMAL ML/MIN/{1.73_M2}
GFR SERPL CREATININE-BSD FRML MDRD: NORMAL ML/MIN/{1.73_M2}
GLOBULIN: ABNORMAL G/DL (ref 1.5–3.8)
GLUCOSE BLD-MCNC: 100 MG/DL (ref 70–99)
GLUCOSE BLD-MCNC: 114 MG/DL (ref 65–105)
GLUCOSE BLD-MCNC: 117 MG/DL (ref 65–105)
GLUCOSE BLD-MCNC: 129 MG/DL (ref 65–105)
GLUCOSE BLD-MCNC: 131 MG/DL (ref 65–105)
GLUCOSE BLD-MCNC: 134 MG/DL (ref 65–105)
GLUCOSE BLD-MCNC: 134 MG/DL (ref 65–105)
GLUCOSE BLD-MCNC: 137 MG/DL (ref 65–105)
GLUCOSE BLD-MCNC: 138 MG/DL (ref 65–105)
GLUCOSE BLD-MCNC: 141 MG/DL (ref 65–105)
GLUCOSE BLD-MCNC: 142 MG/DL (ref 65–105)
GLUCOSE BLD-MCNC: 146 MG/DL (ref 65–105)
GLUCOSE BLD-MCNC: 148 MG/DL (ref 65–105)
GLUCOSE BLD-MCNC: 151 MG/DL (ref 74–100)
GLUCOSE BLD-MCNC: 156 MG/DL (ref 65–105)
GLUCOSE BLD-MCNC: 160 MG/DL (ref 70–99)
GLUCOSE BLD-MCNC: 161 MG/DL (ref 70–99)
GLUCOSE BLD-MCNC: 168 MG/DL (ref 74–100)
GLUCOSE BLD-MCNC: 169 MG/DL (ref 70–99)
GLUCOSE BLD-MCNC: 191 MG/DL (ref 65–105)
GLUCOSE BLD-MCNC: 200 MG/DL (ref 70–99)
GLUCOSE BLD-MCNC: 205 MG/DL (ref 70–99)
GLUCOSE BLD-MCNC: 205 MG/DL (ref 70–99)
GLUCOSE BLD-MCNC: 242 MG/DL (ref 74–100)
GLUCOSE BLD-MCNC: 282 MG/DL (ref 70–99)
GLUCOSE BLD-MCNC: 88 MG/DL (ref 70–99)
GLUCOSE BLD-MCNC: 97 MG/DL (ref 65–105)
GLUCOSE URINE: ABNORMAL
HBA1C MFR BLD: 5.7 % (ref 4–6)
HBA1C MFR BLD: 5.9 % (ref 4–6)
HCO3 VENOUS: 24.3 MMOL/L (ref 22–29)
HCT VFR BLD CALC: 35.4 % (ref 36.3–47.1)
HCT VFR BLD CALC: 36.1 % (ref 36.3–47.1)
HCT VFR BLD CALC: 36.5 % (ref 36.3–47.1)
HCT VFR BLD CALC: 38.7 % (ref 36.3–47.1)
HCT VFR BLD CALC: 40.7 % (ref 36.3–47.1)
HCT VFR BLD CALC: 42 % (ref 36.3–47.1)
HCT VFR BLD CALC: 42.6 % (ref 36.3–47.1)
HCT VFR BLD CALC: 43.3 % (ref 36.3–47.1)
HDLC SERPL-MCNC: 41 MG/DL
HEMOCCULT STL QL: POSITIVE
HEMOGLOBIN: 10.3 G/DL (ref 11.9–15.1)
HEMOGLOBIN: 10.5 G/DL (ref 11.9–15.1)
HEMOGLOBIN: 11 G/DL (ref 11.9–15.1)
HEMOGLOBIN: 11.6 G/DL (ref 11.9–15.1)
HEMOGLOBIN: 12 G/DL (ref 11.9–15.1)
HEMOGLOBIN: 12.1 G/DL (ref 11.9–15.1)
HEMOGLOBIN: 12.3 G/DL (ref 11.9–15.1)
HEMOGLOBIN: 13 G/DL (ref 11.9–15.1)
IMMATURE GRANULOCYTES: 0 %
IMMATURE GRANULOCYTES: 1 %
IMMATURE GRANULOCYTES: 1 %
IMMATURE GRANULOCYTES: 2 %
IMMATURE GRANULOCYTES: 5 %
INR BLD: 1.1
KETONES, URINE: NEGATIVE
LACTIC ACID, WHOLE BLOOD: 1 MMOL/L (ref 0.7–2.1)
LACTIC ACID, WHOLE BLOOD: 1.3 MMOL/L (ref 0.7–2.1)
LACTIC ACID, WHOLE BLOOD: 1.4 MMOL/L (ref 0.7–2.1)
LACTIC ACID, WHOLE BLOOD: 1.7 MMOL/L (ref 0.7–2.1)
LACTIC ACID, WHOLE BLOOD: 2.4 MMOL/L (ref 0.7–2.1)
LACTIC ACID, WHOLE BLOOD: 3.2 MMOL/L (ref 0.7–2.1)
LACTIC ACID, WHOLE BLOOD: 3.4 MMOL/L (ref 0.7–2.1)
LACTIC ACID, WHOLE BLOOD: 3.8 MMOL/L (ref 0.7–2.1)
LACTIC ACID, WHOLE BLOOD: 3.9 MMOL/L (ref 0.7–2.1)
LACTIC ACID, WHOLE BLOOD: 9.9 MMOL/L (ref 0.7–2.1)
LDL CHOLESTEROL: 61 MG/DL (ref 0–130)
LEUKOCYTE ESTERASE, URINE: NEGATIVE
LIPASE: 13 U/L (ref 13–60)
LV EF: 65 %
LVEF MODALITY: NORMAL
LYMPHOCYTES # BLD: 2 % (ref 24–44)
LYMPHOCYTES # BLD: 22 % (ref 24–43)
LYMPHOCYTES # BLD: 22 % (ref 24–44)
LYMPHOCYTES # BLD: 3 % (ref 24–43)
LYMPHOCYTES # BLD: 4 % (ref 24–44)
LYMPHOCYTES # BLD: 4 % (ref 24–44)
LYMPHOCYTES # BLD: 5 % (ref 24–44)
Lab: ABNORMAL
MAGNESIUM: 1.7 MG/DL (ref 1.6–2.6)
MAGNESIUM: 1.8 MG/DL (ref 1.6–2.6)
MAGNESIUM: 1.9 MG/DL (ref 1.6–2.6)
MAGNESIUM: 2.4 MG/DL (ref 1.6–2.6)
MCH RBC QN AUTO: 28.1 PG (ref 25.2–33.5)
MCH RBC QN AUTO: 28.1 PG (ref 25.2–33.5)
MCH RBC QN AUTO: 28.2 PG (ref 25.2–33.5)
MCH RBC QN AUTO: 28.5 PG (ref 25.2–33.5)
MCH RBC QN AUTO: 28.7 PG (ref 25.2–33.5)
MCH RBC QN AUTO: 28.8 PG (ref 25.2–33.5)
MCHC RBC AUTO-ENTMCNC: 28.4 G/DL (ref 28.4–34.8)
MCHC RBC AUTO-ENTMCNC: 29.1 G/DL (ref 28.4–34.8)
MCHC RBC AUTO-ENTMCNC: 29.1 G/DL (ref 28.4–34.8)
MCHC RBC AUTO-ENTMCNC: 29.3 G/DL (ref 28.4–34.8)
MCHC RBC AUTO-ENTMCNC: 29.5 G/DL (ref 28.4–34.8)
MCHC RBC AUTO-ENTMCNC: 30 G/DL (ref 28.4–34.8)
MCHC RBC AUTO-ENTMCNC: 30 G/DL (ref 28.4–34.8)
MCHC RBC AUTO-ENTMCNC: 30.1 G/DL (ref 28.4–34.8)
MCV RBC AUTO: 101.4 FL (ref 82.6–102.9)
MCV RBC AUTO: 93.9 FL (ref 82.6–102.9)
MCV RBC AUTO: 95.5 FL (ref 82.6–102.9)
MCV RBC AUTO: 95.9 FL (ref 82.6–102.9)
MCV RBC AUTO: 96 FL (ref 82.6–102.9)
MCV RBC AUTO: 96.7 FL (ref 82.6–102.9)
MCV RBC AUTO: 97.4 FL (ref 82.6–102.9)
MCV RBC AUTO: 98.1 FL (ref 82.6–102.9)
MDMA URINE: NORMAL
METHADONE SCREEN, URINE: NEGATIVE
METHAMPHETAMINE, URINE: NORMAL
MODE: ABNORMAL
MONOCYTES # BLD: 10 % (ref 3–12)
MONOCYTES # BLD: 12 % (ref 1–7)
MONOCYTES # BLD: 2 % (ref 1–7)
MONOCYTES # BLD: 4 % (ref 1–7)
MONOCYTES # BLD: 6 % (ref 1–7)
MONOCYTES # BLD: 6 % (ref 3–12)
MONOCYTES # BLD: 7 % (ref 1–7)
MORPHOLOGY: ABNORMAL
MRSA, DNA, NASAL: NORMAL
MUCUS: ABNORMAL
NEGATIVE BASE EXCESS, ART: 3 (ref 0–2)
NEGATIVE BASE EXCESS, ART: 3 (ref 0–2)
NEGATIVE BASE EXCESS, ART: 4 (ref 0–2)
NEGATIVE BASE EXCESS, ART: 4 (ref 0–2)
NEGATIVE BASE EXCESS, ART: 5 (ref 0–2)
NEGATIVE BASE EXCESS, ART: 8 (ref 0–2)
NEGATIVE BASE EXCESS, VEN: 8 (ref 0–2)
NITRITE, URINE: NEGATIVE
NRBC AUTOMATED: 0.3 PER 100 WBC
NRBC AUTOMATED: 0.6 PER 100 WBC
NRBC AUTOMATED: 0.6 PER 100 WBC
NRBC AUTOMATED: 0.7 PER 100 WBC
NRBC AUTOMATED: 1.5 PER 100 WBC
NRBC AUTOMATED: 1.9 PER 100 WBC
NRBC AUTOMATED: 1.9 PER 100 WBC
NRBC AUTOMATED: 4.7 PER 100 WBC
NUCLEATED RED BLOOD CELLS: 2 PER 100 WBC
NUCLEATED RED BLOOD CELLS: 3 PER 100 WBC
NUCLEATED RED BLOOD CELLS: 4 PER 100 WBC
O2 DEVICE/FLOW/%: ABNORMAL
O2 SAT, VEN: 37 % (ref 60–85)
OPIATES, URINE: NEGATIVE
ORGANISM: ABNORMAL
OTHER OBSERVATIONS UA: ABNORMAL
OXYCODONE SCREEN URINE: NEGATIVE
PARTIAL THROMBOPLASTIN TIME: 112.1 SEC (ref 20.5–30.5)
PARTIAL THROMBOPLASTIN TIME: 26 SEC (ref 20.5–30.5)
PARTIAL THROMBOPLASTIN TIME: 41.7 SEC (ref 20.5–30.5)
PARTIAL THROMBOPLASTIN TIME: 60.9 SEC (ref 20.5–30.5)
PARTIAL THROMBOPLASTIN TIME: 63.4 SEC (ref 20.5–30.5)
PATIENT TEMP: 37.8
PATIENT TEMP: ABNORMAL
PCO2, VEN: 92.3 MM HG (ref 41–51)
PDW BLD-RTO: 14.6 % (ref 11.8–14.4)
PDW BLD-RTO: 14.6 % (ref 11.8–14.4)
PDW BLD-RTO: 14.8 % (ref 11.8–14.4)
PDW BLD-RTO: 14.9 % (ref 11.8–14.4)
PDW BLD-RTO: 15.1 % (ref 11.8–14.4)
PDW BLD-RTO: 15.4 % (ref 11.8–14.4)
PDW BLD-RTO: 15.5 % (ref 11.8–14.4)
PDW BLD-RTO: 15.5 % (ref 11.8–14.4)
PH UA: 6.5 (ref 5–8)
PH VENOUS: 7.03 (ref 7.32–7.43)
PHENCYCLIDINE, URINE: NEGATIVE
PHOSPHORUS: 4.4 MG/DL (ref 2.6–4.5)
PHOSPHORUS: 4.5 MG/DL (ref 2.6–4.5)
PHOSPHORUS: 5.2 MG/DL (ref 2.6–4.5)
PHOSPHORUS: 5.4 MG/DL (ref 2.6–4.5)
PHOSPHORUS: 5.9 MG/DL (ref 2.6–4.5)
PHOSPHORUS: 6.1 MG/DL (ref 2.6–4.5)
PLATELET # BLD: 261 K/UL (ref 138–453)
PLATELET # BLD: 271 K/UL (ref 138–453)
PLATELET # BLD: 278 K/UL (ref 138–453)
PLATELET # BLD: 296 K/UL (ref 138–453)
PLATELET # BLD: 307 K/UL (ref 138–453)
PLATELET # BLD: 308 K/UL (ref 138–453)
PLATELET # BLD: 310 K/UL (ref 138–453)
PLATELET # BLD: 320 K/UL (ref 138–453)
PLATELET ESTIMATE: ABNORMAL
PMV BLD AUTO: 10 FL (ref 8.1–13.5)
PMV BLD AUTO: 10 FL (ref 8.1–13.5)
PMV BLD AUTO: 10.5 FL (ref 8.1–13.5)
PMV BLD AUTO: 9.5 FL (ref 8.1–13.5)
PMV BLD AUTO: 9.6 FL (ref 8.1–13.5)
PMV BLD AUTO: 9.9 FL (ref 8.1–13.5)
PO2, VEN: 33 MM HG (ref 30–50)
POC CHLORIDE: 110 MMOL/L (ref 98–107)
POC CREATININE: 1.13 MG/DL (ref 0.51–1.19)
POC HCO3: 19.7 MMOL/L (ref 21–28)
POC HCO3: 20.4 MMOL/L (ref 21–28)
POC HCO3: 21.1 MMOL/L (ref 21–28)
POC HCO3: 21.5 MMOL/L (ref 21–28)
POC HCO3: 21.6 MMOL/L (ref 21–28)
POC HCO3: 22.3 MMOL/L (ref 21–28)
POC HCO3: 22.3 MMOL/L (ref 21–28)
POC HCO3: 22.4 MMOL/L (ref 21–28)
POC HCO3: 22.5 MMOL/L (ref 21–28)
POC HCO3: 22.6 MMOL/L (ref 21–28)
POC HCO3: 23 MMOL/L (ref 21–28)
POC HCO3: 23.9 MMOL/L (ref 21–28)
POC HEMATOCRIT: 38 % (ref 36–46)
POC HEMOGLOBIN: 12.8 G/DL (ref 12–16)
POC IONIZED CALCIUM: 1.44 MMOL/L (ref 1.15–1.33)
POC LACTIC ACID: 7.94 MMOL/L (ref 0.56–1.39)
POC O2 SATURATION: 78 % (ref 94–98)
POC O2 SATURATION: 84 % (ref 94–98)
POC O2 SATURATION: 85 % (ref 94–98)
POC O2 SATURATION: 86 % (ref 94–98)
POC O2 SATURATION: 87 % (ref 94–98)
POC O2 SATURATION: 90 % (ref 94–98)
POC O2 SATURATION: 91 % (ref 94–98)
POC O2 SATURATION: 92 % (ref 94–98)
POC O2 SATURATION: 93 % (ref 94–98)
POC O2 SATURATION: 95 % (ref 94–98)
POC O2 SATURATION: 95 % (ref 94–98)
POC O2 SATURATION: 96 % (ref 94–98)
POC PCO2 TEMP: 57 MM HG
POC PCO2 TEMP: ABNORMAL MM HG
POC PCO2: 44.1 MM HG (ref 35–48)
POC PCO2: 44.3 MM HG (ref 35–48)
POC PCO2: 45 MM HG (ref 35–48)
POC PCO2: 45.4 MM HG (ref 35–48)
POC PCO2: 46 MM HG (ref 35–48)
POC PCO2: 48 MM HG (ref 35–48)
POC PCO2: 48.1 MM HG (ref 35–48)
POC PCO2: 50.1 MM HG (ref 35–48)
POC PCO2: 50.4 MM HG (ref 35–48)
POC PCO2: 51.5 MM HG (ref 35–48)
POC PCO2: 53 MM HG (ref 35–48)
POC PCO2: 54.9 MM HG (ref 35–48)
POC PH TEMP: 7.18
POC PH TEMP: ABNORMAL
POC PH: 7.19 (ref 7.35–7.45)
POC PH: 7.2 (ref 7.35–7.45)
POC PH: 7.22 (ref 7.35–7.45)
POC PH: 7.26 (ref 7.35–7.45)
POC PH: 7.26 (ref 7.35–7.45)
POC PH: 7.28 (ref 7.35–7.45)
POC PH: 7.29 (ref 7.35–7.45)
POC PH: 7.3 (ref 7.35–7.45)
POC PH: 7.31 (ref 7.35–7.45)
POC PH: 7.32 (ref 7.35–7.45)
POC PO2 TEMP: 56 MM HG
POC PO2 TEMP: ABNORMAL MM HG
POC PO2: 52.6 MM HG (ref 83–108)
POC PO2: 58.3 MM HG (ref 83–108)
POC PO2: 59.7 MM HG (ref 83–108)
POC PO2: 60 MM HG (ref 83–108)
POC PO2: 62.2 MM HG (ref 83–108)
POC PO2: 66.3 MM HG (ref 83–108)
POC PO2: 67.1 MM HG (ref 83–108)
POC PO2: 71.4 MM HG (ref 83–108)
POC PO2: 78.4 MM HG (ref 83–108)
POC PO2: 86.7 MM HG (ref 83–108)
POC PO2: 86.7 MM HG (ref 83–108)
POC PO2: 87.8 MM HG (ref 83–108)
POC POTASSIUM: 3.4 MMOL/L (ref 3.5–4.5)
POC SODIUM: 145 MMOL/L (ref 138–146)
POC TROPONIN I: 0.04 NG/ML (ref 0–0.1)
POC TROPONIN INTERP: NORMAL
POSITIVE BASE EXCESS, ART: ABNORMAL (ref 0–3)
POSITIVE BASE EXCESS, VEN: ABNORMAL (ref 0–3)
POTASSIUM SERPL-SCNC: 3.6 MMOL/L (ref 3.7–5.3)
POTASSIUM SERPL-SCNC: 3.8 MMOL/L (ref 3.7–5.3)
POTASSIUM SERPL-SCNC: 3.9 MMOL/L (ref 3.7–5.3)
POTASSIUM SERPL-SCNC: 4 MMOL/L (ref 3.7–5.3)
POTASSIUM SERPL-SCNC: 4 MMOL/L (ref 3.7–5.3)
POTASSIUM SERPL-SCNC: 4.3 MMOL/L (ref 3.7–5.3)
POTASSIUM SERPL-SCNC: 5.1 MMOL/L (ref 3.7–5.3)
PROPOXYPHENE, URINE: NORMAL
PROTEIN UA: ABNORMAL
PROTHROMBIN TIME: 11.5 SEC (ref 9–12)
RBC # BLD: 3.66 M/UL (ref 3.95–5.11)
RBC # BLD: 3.68 M/UL (ref 3.95–5.11)
RBC # BLD: 3.82 M/UL (ref 3.95–5.11)
RBC # BLD: 4.03 M/UL (ref 3.95–5.11)
RBC # BLD: 4.18 M/UL (ref 3.95–5.11)
RBC # BLD: 4.2 M/UL (ref 3.95–5.11)
RBC # BLD: 4.38 M/UL (ref 3.95–5.11)
RBC # BLD: 4.61 M/UL (ref 3.95–5.11)
RBC # BLD: ABNORMAL 10*6/UL
RBC UA: ABNORMAL /HPF (ref 0–2)
REASON FOR REJECTION: NORMAL
RENAL EPITHELIAL, UA: ABNORMAL /HPF
SALICYLATE LEVEL: <1 MG/DL (ref 3–10)
SAMPLE SITE: ABNORMAL
SEG NEUTROPHILS: 65 % (ref 36–66)
SEG NEUTROPHILS: 66 % (ref 36–65)
SEG NEUTROPHILS: 84 % (ref 36–66)
SEG NEUTROPHILS: 88 % (ref 36–66)
SEG NEUTROPHILS: 90 % (ref 36–65)
SEG NEUTROPHILS: 91 % (ref 36–66)
SEG NEUTROPHILS: 96 % (ref 36–66)
SEGMENTED NEUTROPHILS ABSOLUTE COUNT: 15.83 K/UL (ref 1.5–8.1)
SEGMENTED NEUTROPHILS ABSOLUTE COUNT: 16.54 K/UL (ref 1.8–7.7)
SEGMENTED NEUTROPHILS ABSOLUTE COUNT: 17.74 K/UL (ref 1.8–7.7)
SEGMENTED NEUTROPHILS ABSOLUTE COUNT: 18.56 K/UL (ref 1.8–7.7)
SEGMENTED NEUTROPHILS ABSOLUTE COUNT: 18.92 K/UL (ref 1.8–7.7)
SEGMENTED NEUTROPHILS ABSOLUTE COUNT: 4.48 K/UL (ref 1.5–8.1)
SEGMENTED NEUTROPHILS ABSOLUTE COUNT: 9.17 K/UL (ref 1.8–7.7)
SODIUM BLD-SCNC: 141 MMOL/L (ref 135–144)
SODIUM BLD-SCNC: 142 MMOL/L (ref 135–144)
SODIUM BLD-SCNC: 142 MMOL/L (ref 135–144)
SODIUM BLD-SCNC: 143 MMOL/L (ref 135–144)
SODIUM BLD-SCNC: 146 MMOL/L (ref 135–144)
SODIUM BLD-SCNC: 147 MMOL/L (ref 135–144)
SODIUM BLD-SCNC: 149 MMOL/L (ref 135–144)
SPECIFIC GRAVITY UA: 1.02 (ref 1–1.03)
SPECIMEN DESCRIPTION: ABNORMAL
SPECIMEN DESCRIPTION: NORMAL
STATUS: ABNORMAL
TCO2 (CALC), ART: 21 MMOL/L (ref 22–29)
TCO2 (CALC), ART: 22 MMOL/L (ref 22–29)
TCO2 (CALC), ART: 23 MMOL/L (ref 22–29)
TCO2 (CALC), ART: 24 MMOL/L (ref 22–29)
TCO2 (CALC), ART: 26 MMOL/L (ref 22–29)
TEST INFORMATION: NORMAL
THYROXINE, FREE: 1.05 NG/DL (ref 0.93–1.7)
TOTAL CK: 72 U/L (ref 26–192)
TOTAL CO2, VENOUS: 27 MMOL/L (ref 23–30)
TOTAL PROTEIN: 6.1 G/DL (ref 6.4–8.3)
TOTAL PROTEIN: 7.2 G/DL (ref 6.4–8.3)
TOXIC TRICYCLIC SC,BLOOD: NEGATIVE
TRICHOMONAS: ABNORMAL
TRICYCLIC ANTIDEPRESSANTS, UR: NORMAL
TRIGL SERPL-MCNC: 140 MG/DL
TRIGLYCERIDE, FASTING: 106 MG/DL
TROPONIN INTERP: ABNORMAL
TROPONIN INTERP: NORMAL
TROPONIN T: 0.05 NG/ML
TROPONIN T: 0.06 NG/ML
TROPONIN T: 0.07 NG/ML
TROPONIN T: 0.07 NG/ML
TROPONIN T: 0.08 NG/ML
TROPONIN T: 0.12 NG/ML
TROPONIN T: 0.13 NG/ML
TROPONIN T: <0.03 NG/ML
TSH SERPL DL<=0.05 MIU/L-ACNC: 1.6 MIU/L (ref 0.3–5)
TURBIDITY: ABNORMAL
URINE HGB: ABNORMAL
UROBILINOGEN, URINE: NORMAL
VITAMIN D 25-HYDROXY: 33.6 NG/ML (ref 30–100)
VLDLC SERPL CALC-MCNC: NORMAL MG/DL (ref 1–30)
WBC # BLD: 14.1 K/UL (ref 3.5–11.3)
WBC # BLD: 17.6 K/UL (ref 3.5–11.3)
WBC # BLD: 18.8 K/UL (ref 3.5–11.3)
WBC # BLD: 19.5 K/UL (ref 3.5–11.3)
WBC # BLD: 19.7 K/UL (ref 3.5–11.3)
WBC # BLD: 20.7 K/UL (ref 3.5–11.3)
WBC # BLD: 22.1 K/UL (ref 3.5–11.3)
WBC # BLD: 6.8 K/UL (ref 3.5–11.3)
WBC # BLD: ABNORMAL 10*3/UL
WBC UA: ABNORMAL /HPF (ref 0–5)
YEAST: ABNORMAL
ZZ NTE CLEAN UP: ORDERED TEST: NORMAL
ZZ NTE WITH NAME CLEAN UP: SPECIMEN SOURCE: NORMAL

## 2018-01-01 PROCEDURE — G8598 ASA/ANTIPLAT THER USED: HCPCS | Performed by: HOSPITALIST

## 2018-01-01 PROCEDURE — 82803 BLOOD GASES ANY COMBINATION: CPT

## 2018-01-01 PROCEDURE — G8417 CALC BMI ABV UP PARAM F/U: HCPCS | Performed by: STUDENT IN AN ORGANIZED HEALTH CARE EDUCATION/TRAINING PROGRAM

## 2018-01-01 PROCEDURE — 83735 ASSAY OF MAGNESIUM: CPT

## 2018-01-01 PROCEDURE — 87077 CULTURE AEROBIC IDENTIFY: CPT

## 2018-01-01 PROCEDURE — S0028 INJECTION, FAMOTIDINE, 20 MG: HCPCS | Performed by: STUDENT IN AN ORGANIZED HEALTH CARE EDUCATION/TRAINING PROGRAM

## 2018-01-01 PROCEDURE — 80048 BASIC METABOLIC PNL TOTAL CA: CPT

## 2018-01-01 PROCEDURE — 77067 SCR MAMMO BI INCL CAD: CPT

## 2018-01-01 PROCEDURE — 85014 HEMATOCRIT: CPT

## 2018-01-01 PROCEDURE — 82947 ASSAY GLUCOSE BLOOD QUANT: CPT

## 2018-01-01 PROCEDURE — 82330 ASSAY OF CALCIUM: CPT

## 2018-01-01 PROCEDURE — 92950 HEART/LUNG RESUSCITATION CPR: CPT

## 2018-01-01 PROCEDURE — 85025 COMPLETE CBC W/AUTO DIFF WBC: CPT

## 2018-01-01 PROCEDURE — 2580000003 HC RX 258: Performed by: STUDENT IN AN ORGANIZED HEALTH CARE EDUCATION/TRAINING PROGRAM

## 2018-01-01 PROCEDURE — 94770 HC ETCO2 MONITOR DAILY: CPT

## 2018-01-01 PROCEDURE — 4040F PNEUMOC VAC/ADMIN/RCVD: CPT | Performed by: STUDENT IN AN ORGANIZED HEALTH CARE EDUCATION/TRAINING PROGRAM

## 2018-01-01 PROCEDURE — 2500000003 HC RX 250 WO HCPCS: Performed by: STUDENT IN AN ORGANIZED HEALTH CARE EDUCATION/TRAINING PROGRAM

## 2018-01-01 PROCEDURE — 2000000000 HC ICU R&B

## 2018-01-01 PROCEDURE — 82274 ASSAY TEST FOR BLOOD FECAL: CPT | Performed by: HOSPITALIST

## 2018-01-01 PROCEDURE — 99211 OFF/OP EST MAY X REQ PHY/QHP: CPT

## 2018-01-01 PROCEDURE — 6360000002 HC RX W HCPCS: Performed by: EMERGENCY MEDICINE

## 2018-01-01 PROCEDURE — G8427 DOCREV CUR MEDS BY ELIG CLIN: HCPCS | Performed by: STUDENT IN AN ORGANIZED HEALTH CARE EDUCATION/TRAINING PROGRAM

## 2018-01-01 PROCEDURE — 2500000003 HC RX 250 WO HCPCS: Performed by: EMERGENCY MEDICINE

## 2018-01-01 PROCEDURE — 84100 ASSAY OF PHOSPHORUS: CPT

## 2018-01-01 PROCEDURE — 87186 SC STD MICRODIL/AGAR DIL: CPT

## 2018-01-01 PROCEDURE — 95819 EEG AWAKE AND ASLEEP: CPT

## 2018-01-01 PROCEDURE — 2580000003 HC RX 258: Performed by: EMERGENCY MEDICINE

## 2018-01-01 PROCEDURE — 70450 CT HEAD/BRAIN W/O DYE: CPT

## 2018-01-01 PROCEDURE — 6360000002 HC RX W HCPCS: Performed by: HOSPITALIST

## 2018-01-01 PROCEDURE — 3017F COLORECTAL CA SCREEN DOC REV: CPT | Performed by: HOSPITALIST

## 2018-01-01 PROCEDURE — 99211 OFF/OP EST MAY X REQ PHY/QHP: CPT | Performed by: STUDENT IN AN ORGANIZED HEALTH CARE EDUCATION/TRAINING PROGRAM

## 2018-01-01 PROCEDURE — 36600 WITHDRAWAL OF ARTERIAL BLOOD: CPT

## 2018-01-01 PROCEDURE — 2580000003 HC RX 258

## 2018-01-01 PROCEDURE — 80307 DRUG TEST PRSMV CHEM ANLYZR: CPT

## 2018-01-01 PROCEDURE — 87086 URINE CULTURE/COLONY COUNT: CPT

## 2018-01-01 PROCEDURE — 6360000002 HC RX W HCPCS: Performed by: STUDENT IN AN ORGANIZED HEALTH CARE EDUCATION/TRAINING PROGRAM

## 2018-01-01 PROCEDURE — 94640 AIRWAY INHALATION TREATMENT: CPT

## 2018-01-01 PROCEDURE — G8399 PT W/DXA RESULTS DOCUMENT: HCPCS | Performed by: SURGERY

## 2018-01-01 PROCEDURE — G8482 FLU IMMUNIZE ORDER/ADMIN: HCPCS | Performed by: STUDENT IN AN ORGANIZED HEALTH CARE EDUCATION/TRAINING PROGRAM

## 2018-01-01 PROCEDURE — 6370000000 HC RX 637 (ALT 250 FOR IP): Performed by: EMERGENCY MEDICINE

## 2018-01-01 PROCEDURE — 94003 VENT MGMT INPAT SUBQ DAY: CPT

## 2018-01-01 PROCEDURE — 3017F COLORECTAL CA SCREEN DOC REV: CPT | Performed by: STUDENT IN AN ORGANIZED HEALTH CARE EDUCATION/TRAINING PROGRAM

## 2018-01-01 PROCEDURE — 83690 ASSAY OF LIPASE: CPT

## 2018-01-01 PROCEDURE — 36620 INSERTION CATHETER ARTERY: CPT

## 2018-01-01 PROCEDURE — 81001 URINALYSIS AUTO W/SCOPE: CPT

## 2018-01-01 PROCEDURE — 99291 CRITICAL CARE FIRST HOUR: CPT | Performed by: INTERNAL MEDICINE

## 2018-01-01 PROCEDURE — 1123F ACP DISCUSS/DSCN MKR DOCD: CPT | Performed by: STUDENT IN AN ORGANIZED HEALTH CARE EDUCATION/TRAINING PROGRAM

## 2018-01-01 PROCEDURE — 6360000002 HC RX W HCPCS: Performed by: INTERNAL MEDICINE

## 2018-01-01 PROCEDURE — 1090F PRES/ABSN URINE INCON ASSESS: CPT | Performed by: SURGERY

## 2018-01-01 PROCEDURE — G8427 DOCREV CUR MEDS BY ELIG CLIN: HCPCS | Performed by: HOSPITALIST

## 2018-01-01 PROCEDURE — 1090F PRES/ABSN URINE INCON ASSESS: CPT | Performed by: STUDENT IN AN ORGANIZED HEALTH CARE EDUCATION/TRAINING PROGRAM

## 2018-01-01 PROCEDURE — 93005 ELECTROCARDIOGRAM TRACING: CPT

## 2018-01-01 PROCEDURE — 90686 IIV4 VACC NO PRSV 0.5 ML IM: CPT | Performed by: FAMILY MEDICINE

## 2018-01-01 PROCEDURE — 82550 ASSAY OF CK (CPK): CPT

## 2018-01-01 PROCEDURE — 1036F TOBACCO NON-USER: CPT | Performed by: STUDENT IN AN ORGANIZED HEALTH CARE EDUCATION/TRAINING PROGRAM

## 2018-01-01 PROCEDURE — 85027 COMPLETE CBC AUTOMATED: CPT

## 2018-01-01 PROCEDURE — 94762 N-INVAS EAR/PLS OXIMTRY CONT: CPT

## 2018-01-01 PROCEDURE — 3014F SCREEN MAMMO DOC REV: CPT | Performed by: HOSPITALIST

## 2018-01-01 PROCEDURE — 84443 ASSAY THYROID STIM HORMONE: CPT

## 2018-01-01 PROCEDURE — 99213 OFFICE O/P EST LOW 20 MIN: CPT | Performed by: STUDENT IN AN ORGANIZED HEALTH CARE EDUCATION/TRAINING PROGRAM

## 2018-01-01 PROCEDURE — 4040F PNEUMOC VAC/ADMIN/RCVD: CPT | Performed by: SURGERY

## 2018-01-01 PROCEDURE — S0028 INJECTION, FAMOTIDINE, 20 MG: HCPCS | Performed by: EMERGENCY MEDICINE

## 2018-01-01 PROCEDURE — 95816 EEG AWAKE AND DROWSY: CPT | Performed by: PSYCHIATRY & NEUROLOGY

## 2018-01-01 PROCEDURE — 84478 ASSAY OF TRIGLYCERIDES: CPT

## 2018-01-01 PROCEDURE — 2700000000 HC OXYGEN THERAPY PER DAY

## 2018-01-01 PROCEDURE — 36415 COLL VENOUS BLD VENIPUNCTURE: CPT

## 2018-01-01 PROCEDURE — 83036 HEMOGLOBIN GLYCOSYLATED A1C: CPT

## 2018-01-01 PROCEDURE — 85610 PROTHROMBIN TIME: CPT

## 2018-01-01 PROCEDURE — 4040F PNEUMOC VAC/ADMIN/RCVD: CPT | Performed by: HOSPITALIST

## 2018-01-01 PROCEDURE — 82565 ASSAY OF CREATININE: CPT

## 2018-01-01 PROCEDURE — 82140 ASSAY OF AMMONIA: CPT

## 2018-01-01 PROCEDURE — 71260 CT THORAX DX C+: CPT

## 2018-01-01 PROCEDURE — G8417 CALC BMI ABV UP PARAM F/U: HCPCS | Performed by: HOSPITALIST

## 2018-01-01 PROCEDURE — 82435 ASSAY OF BLOOD CHLORIDE: CPT

## 2018-01-01 PROCEDURE — 99211 OFF/OP EST MAY X REQ PHY/QHP: CPT | Performed by: FAMILY MEDICINE

## 2018-01-01 PROCEDURE — 1123F ACP DISCUSS/DSCN MKR DOCD: CPT | Performed by: HOSPITALIST

## 2018-01-01 PROCEDURE — 83605 ASSAY OF LACTIC ACID: CPT

## 2018-01-01 PROCEDURE — 94002 VENT MGMT INPAT INIT DAY: CPT

## 2018-01-01 PROCEDURE — 1090F PRES/ABSN URINE INCON ASSESS: CPT | Performed by: HOSPITALIST

## 2018-01-01 PROCEDURE — 84439 ASSAY OF FREE THYROXINE: CPT

## 2018-01-01 PROCEDURE — 2500000003 HC RX 250 WO HCPCS

## 2018-01-01 PROCEDURE — 84484 ASSAY OF TROPONIN QUANT: CPT

## 2018-01-01 PROCEDURE — 85730 THROMBOPLASTIN TIME PARTIAL: CPT

## 2018-01-01 PROCEDURE — 1036F TOBACCO NON-USER: CPT | Performed by: SURGERY

## 2018-01-01 PROCEDURE — 99291 CRITICAL CARE FIRST HOUR: CPT

## 2018-01-01 PROCEDURE — 87641 MR-STAPH DNA AMP PROBE: CPT

## 2018-01-01 PROCEDURE — 1101F PT FALLS ASSESS-DOCD LE1/YR: CPT | Performed by: STUDENT IN AN ORGANIZED HEALTH CARE EDUCATION/TRAINING PROGRAM

## 2018-01-01 PROCEDURE — 1036F TOBACCO NON-USER: CPT | Performed by: HOSPITALIST

## 2018-01-01 PROCEDURE — 05HM33Z INSERTION OF INFUSION DEVICE INTO RIGHT INTERNAL JUGULAR VEIN, PERCUTANEOUS APPROACH: ICD-10-PCS | Performed by: EMERGENCY MEDICINE

## 2018-01-01 PROCEDURE — 93306 TTE W/DOPPLER COMPLETE: CPT

## 2018-01-01 PROCEDURE — 80053 COMPREHEN METABOLIC PANEL: CPT

## 2018-01-01 PROCEDURE — 80061 LIPID PANEL: CPT

## 2018-01-01 PROCEDURE — G0480 DRUG TEST DEF 1-7 CLASSES: HCPCS

## 2018-01-01 PROCEDURE — G8427 DOCREV CUR MEDS BY ELIG CLIN: HCPCS | Performed by: SURGERY

## 2018-01-01 PROCEDURE — 0BH17EZ INSERTION OF ENDOTRACHEAL AIRWAY INTO TRACHEA, VIA NATURAL OR ARTIFICIAL OPENING: ICD-10-PCS | Performed by: EMERGENCY MEDICINE

## 2018-01-01 PROCEDURE — 1123F ACP DISCUSS/DSCN MKR DOCD: CPT | Performed by: SURGERY

## 2018-01-01 PROCEDURE — 82010 KETONE BODYS QUAN: CPT

## 2018-01-01 PROCEDURE — 99213 OFFICE O/P EST LOW 20 MIN: CPT | Performed by: HOSPITALIST

## 2018-01-01 PROCEDURE — 71045 X-RAY EXAM CHEST 1 VIEW: CPT

## 2018-01-01 PROCEDURE — 99213 OFFICE O/P EST LOW 20 MIN: CPT

## 2018-01-01 PROCEDURE — 84295 ASSAY OF SERUM SODIUM: CPT

## 2018-01-01 PROCEDURE — G8399 PT W/DXA RESULTS DOCUMENT: HCPCS | Performed by: HOSPITALIST

## 2018-01-01 PROCEDURE — G8598 ASA/ANTIPLAT THER USED: HCPCS | Performed by: SURGERY

## 2018-01-01 PROCEDURE — 2500000003 HC RX 250 WO HCPCS: Performed by: HOSPITALIST

## 2018-01-01 PROCEDURE — G8484 FLU IMMUNIZE NO ADMIN: HCPCS | Performed by: HOSPITALIST

## 2018-01-01 PROCEDURE — 99203 OFFICE O/P NEW LOW 30 MIN: CPT | Performed by: SURGERY

## 2018-01-01 PROCEDURE — 87040 BLOOD CULTURE FOR BACTERIA: CPT

## 2018-01-01 PROCEDURE — 82306 VITAMIN D 25 HYDROXY: CPT

## 2018-01-01 PROCEDURE — 36556 INSERT NON-TUNNEL CV CATH: CPT

## 2018-01-01 PROCEDURE — 3017F COLORECTAL CA SCREEN DOC REV: CPT | Performed by: SURGERY

## 2018-01-01 PROCEDURE — G8598 ASA/ANTIPLAT THER USED: HCPCS | Performed by: STUDENT IN AN ORGANIZED HEALTH CARE EDUCATION/TRAINING PROGRAM

## 2018-01-01 PROCEDURE — 99232 SBSQ HOSP IP/OBS MODERATE 35: CPT | Performed by: PSYCHIATRY & NEUROLOGY

## 2018-01-01 PROCEDURE — 99222 1ST HOSP IP/OBS MODERATE 55: CPT | Performed by: PSYCHIATRY & NEUROLOGY

## 2018-01-01 PROCEDURE — G8400 PT W/DXA NO RESULTS DOC: HCPCS | Performed by: STUDENT IN AN ORGANIZED HEALTH CARE EDUCATION/TRAINING PROGRAM

## 2018-01-01 PROCEDURE — 6370000000 HC RX 637 (ALT 250 FOR IP): Performed by: HOSPITALIST

## 2018-01-01 PROCEDURE — 6360000004 HC RX CONTRAST MEDICATION: Performed by: EMERGENCY MEDICINE

## 2018-01-01 PROCEDURE — 80076 HEPATIC FUNCTION PANEL: CPT

## 2018-01-01 PROCEDURE — G8417 CALC BMI ABV UP PARAM F/U: HCPCS | Performed by: SURGERY

## 2018-01-01 PROCEDURE — 5A1955Z RESPIRATORY VENTILATION, GREATER THAN 96 CONSECUTIVE HOURS: ICD-10-PCS | Performed by: EMERGENCY MEDICINE

## 2018-01-01 PROCEDURE — G8399 PT W/DXA RESULTS DOCUMENT: HCPCS | Performed by: STUDENT IN AN ORGANIZED HEALTH CARE EDUCATION/TRAINING PROGRAM

## 2018-01-01 PROCEDURE — 84132 ASSAY OF SERUM POTASSIUM: CPT

## 2018-01-01 PROCEDURE — 6360000002 HC RX W HCPCS

## 2018-01-01 RX ORDER — CLOPIDOGREL BISULFATE 75 MG/1
TABLET ORAL
Qty: 30 TABLET | Refills: 0 | Status: SHIPPED | OUTPATIENT
Start: 2018-01-01 | End: 2018-01-01 | Stop reason: SDUPTHER

## 2018-01-01 RX ORDER — THIAMINE MONONITRATE (VIT B1) 100 MG
TABLET ORAL
Qty: 30 TABLET | Refills: 2 | Status: SHIPPED | OUTPATIENT
Start: 2018-01-01 | End: 2018-01-01 | Stop reason: ALTCHOICE

## 2018-01-01 RX ORDER — ISOSORBIDE MONONITRATE 30 MG/1
TABLET, EXTENDED RELEASE ORAL
Qty: 30 TABLET | Refills: 0 | Status: SHIPPED | OUTPATIENT
Start: 2018-01-01 | End: 2018-01-01 | Stop reason: SDUPTHER

## 2018-01-01 RX ORDER — ACETAMINOPHEN 650 MG/1
650 SUPPOSITORY RECTAL EVERY 4 HOURS PRN
Status: DISCONTINUED | OUTPATIENT
Start: 2018-01-01 | End: 2018-01-01 | Stop reason: HOSPADM

## 2018-01-01 RX ORDER — 0.9 % SODIUM CHLORIDE 0.9 %
500 INTRAVENOUS SOLUTION INTRAVENOUS ONCE
Status: COMPLETED | OUTPATIENT
Start: 2018-01-01 | End: 2018-01-01

## 2018-01-01 RX ORDER — OMEPRAZOLE 20 MG/1
CAPSULE, DELAYED RELEASE ORAL
Qty: 30 CAPSULE | Refills: 0 | Status: SHIPPED | OUTPATIENT
Start: 2018-01-01 | End: 2018-01-01 | Stop reason: SDUPTHER

## 2018-01-01 RX ORDER — POLYETHYLENE GLYCOL 3350 17 G/17G
POWDER, FOR SOLUTION ORAL
Qty: 238 G | Refills: 0 | Status: SHIPPED | OUTPATIENT
Start: 2018-01-01

## 2018-01-01 RX ORDER — ASPIRIN 81 MG/1
TABLET ORAL
Qty: 30 TABLET | Refills: 0 | Status: SHIPPED | OUTPATIENT
Start: 2018-01-01 | End: 2018-01-01 | Stop reason: SDUPTHER

## 2018-01-01 RX ORDER — LANSOPRAZOLE 15 MG/1
CAPSULE, DELAYED RELEASE ORAL
Qty: 30 CAPSULE | Refills: 0 | Status: SHIPPED | OUTPATIENT
Start: 2018-01-01 | End: 2018-01-01 | Stop reason: SDUPTHER

## 2018-01-01 RX ORDER — FERROUS SULFATE 325(65) MG
TABLET ORAL
Qty: 60 TABLET | Refills: 2 | Status: SHIPPED | OUTPATIENT
Start: 2018-01-01 | End: 2018-01-01 | Stop reason: ALTCHOICE

## 2018-01-01 RX ORDER — LISINOPRIL 5 MG/1
TABLET ORAL
Qty: 60 TABLET | Refills: 0 | Status: SHIPPED | OUTPATIENT
Start: 2018-01-01 | End: 2018-01-01 | Stop reason: SDUPTHER

## 2018-01-01 RX ORDER — THIAMINE MONONITRATE (VIT B1) 100 MG
TABLET ORAL
Qty: 30 TABLET | Refills: 0 | Status: SHIPPED | OUTPATIENT
Start: 2018-01-01 | End: 2018-01-01 | Stop reason: SDUPTHER

## 2018-01-01 RX ORDER — LABETALOL HYDROCHLORIDE 5 MG/ML
5 INJECTION, SOLUTION INTRAVENOUS EVERY 6 HOURS PRN
Status: DISCONTINUED | OUTPATIENT
Start: 2018-01-01 | End: 2018-01-01

## 2018-01-01 RX ORDER — THIAMINE MONONITRATE (VIT B1) 100 MG
TABLET ORAL
Qty: 30 TABLET | Refills: 0 | Status: SHIPPED | OUTPATIENT
Start: 2018-01-01 | End: 2018-01-01

## 2018-01-01 RX ORDER — CHLORHEXIDINE GLUCONATE 0.12 MG/ML
15 RINSE ORAL 2 TIMES DAILY
Status: DISCONTINUED | OUTPATIENT
Start: 2018-01-01 | End: 2018-01-01

## 2018-01-01 RX ORDER — ATORVASTATIN CALCIUM 40 MG/1
TABLET, FILM COATED ORAL
Qty: 30 TABLET | Refills: 0 | Status: SHIPPED | OUTPATIENT
Start: 2018-01-01 | End: 2018-01-01 | Stop reason: SDUPTHER

## 2018-01-01 RX ORDER — FENTANYL CITRATE 50 UG/ML
50 INJECTION, SOLUTION INTRAMUSCULAR; INTRAVENOUS
Status: DISCONTINUED | OUTPATIENT
Start: 2018-01-01 | End: 2018-01-01

## 2018-01-01 RX ORDER — THIAMINE MONONITRATE (VIT B1) 100 MG
TABLET ORAL
Qty: 30 TABLET | Refills: 0 | Status: SHIPPED | OUTPATIENT
Start: 2018-01-01 | End: 2018-01-01 | Stop reason: ALTCHOICE

## 2018-01-01 RX ORDER — PROPOFOL 10 MG/ML
10 INJECTION, EMULSION INTRAVENOUS
Status: DISCONTINUED | OUTPATIENT
Start: 2018-01-01 | End: 2018-01-01 | Stop reason: HOSPADM

## 2018-01-01 RX ORDER — ASPIRIN 81 MG/1
81 TABLET ORAL DAILY
Qty: 30 TABLET | Refills: 3 | Status: SHIPPED | OUTPATIENT
Start: 2018-01-01

## 2018-01-01 RX ORDER — METOPROLOL TARTRATE 100 MG/1
TABLET ORAL
Qty: 30 TABLET | Refills: 0 | Status: SHIPPED | OUTPATIENT
Start: 2018-01-01 | End: 2018-01-01 | Stop reason: SDUPTHER

## 2018-01-01 RX ORDER — DEXAMETHASONE SODIUM PHOSPHATE 10 MG/ML
10 INJECTION INTRAMUSCULAR; INTRAVENOUS ONCE
Status: COMPLETED | OUTPATIENT
Start: 2018-01-01 | End: 2018-01-01

## 2018-01-01 RX ORDER — VECURONIUM BROMIDE 1 MG/ML
10 INJECTION, POWDER, LYOPHILIZED, FOR SOLUTION INTRAVENOUS ONCE
Status: COMPLETED | OUTPATIENT
Start: 2018-01-01 | End: 2018-01-01

## 2018-01-01 RX ORDER — ISOSORBIDE MONONITRATE 30 MG/1
TABLET, EXTENDED RELEASE ORAL
Qty: 30 TABLET | Refills: 0 | OUTPATIENT
Start: 2018-01-01

## 2018-01-01 RX ORDER — HEPARIN SODIUM 1000 [USP'U]/ML
2000 INJECTION, SOLUTION INTRAVENOUS; SUBCUTANEOUS PRN
Status: DISCONTINUED | OUTPATIENT
Start: 2018-01-01 | End: 2018-01-01

## 2018-01-01 RX ORDER — HEPARIN SODIUM 5000 [USP'U]/ML
5000 INJECTION, SOLUTION INTRAVENOUS; SUBCUTANEOUS EVERY 8 HOURS SCHEDULED
Status: DISCONTINUED | OUTPATIENT
Start: 2018-01-01 | End: 2018-01-01 | Stop reason: HOSPADM

## 2018-01-01 RX ORDER — LABETALOL HYDROCHLORIDE 5 MG/ML
5 INJECTION, SOLUTION INTRAVENOUS ONCE
Status: COMPLETED | OUTPATIENT
Start: 2018-01-01 | End: 2018-01-01

## 2018-01-01 RX ORDER — SODIUM CHLORIDE 450 MG/100ML
INJECTION, SOLUTION INTRAVENOUS CONTINUOUS
Status: DISCONTINUED | OUTPATIENT
Start: 2018-01-01 | End: 2018-01-01 | Stop reason: HOSPADM

## 2018-01-01 RX ORDER — OMEPRAZOLE 20 MG/1
CAPSULE, DELAYED RELEASE ORAL
Qty: 30 CAPSULE | Refills: 0 | Status: SHIPPED | OUTPATIENT
Start: 2018-01-01 | End: 2018-01-01

## 2018-01-01 RX ORDER — IPRATROPIUM BROMIDE AND ALBUTEROL SULFATE 2.5; .5 MG/3ML; MG/3ML
1 SOLUTION RESPIRATORY (INHALATION) 4 TIMES DAILY
Status: DISCONTINUED | OUTPATIENT
Start: 2018-01-01 | End: 2018-01-01 | Stop reason: HOSPADM

## 2018-01-01 RX ORDER — FENTANYL CITRATE 50 UG/ML
100 INJECTION, SOLUTION INTRAMUSCULAR; INTRAVENOUS
Status: DISCONTINUED | OUTPATIENT
Start: 2018-01-01 | End: 2018-01-01

## 2018-01-01 RX ORDER — ISOSORBIDE MONONITRATE 30 MG/1
TABLET, EXTENDED RELEASE ORAL
Qty: 30 TABLET | Refills: 0 | Status: SHIPPED | OUTPATIENT
Start: 2018-01-01 | End: 2018-01-01

## 2018-01-01 RX ORDER — FENTANYL CITRATE 50 UG/ML
100 INJECTION, SOLUTION INTRAMUSCULAR; INTRAVENOUS
Status: DISCONTINUED | OUTPATIENT
Start: 2018-01-01 | End: 2018-01-01 | Stop reason: HOSPADM

## 2018-01-01 RX ORDER — ASPIRIN 81 MG/1
TABLET ORAL
Qty: 30 TABLET | Refills: 0 | Status: SHIPPED | OUTPATIENT
Start: 2018-01-01 | End: 2018-01-01 | Stop reason: ALTCHOICE

## 2018-01-01 RX ORDER — MULTIVITAMIN WITH FOLIC ACID 400 MCG
TABLET ORAL
Qty: 90 TABLET | Refills: 0 | Status: SHIPPED | OUTPATIENT
Start: 2018-01-01 | End: 2018-01-01 | Stop reason: SDUPTHER

## 2018-01-01 RX ORDER — OMEPRAZOLE 20 MG/1
CAPSULE, DELAYED RELEASE ORAL
Qty: 30 CAPSULE | Refills: 3 | Status: SHIPPED | OUTPATIENT
Start: 2018-01-01 | End: 2018-01-01 | Stop reason: SDUPTHER

## 2018-01-01 RX ORDER — ATORVASTATIN CALCIUM 40 MG/1
TABLET, FILM COATED ORAL
Qty: 30 TABLET | Refills: 0 | Status: SHIPPED | OUTPATIENT
Start: 2018-01-01 | End: 2018-01-01

## 2018-01-01 RX ORDER — AMLODIPINE BESYLATE 5 MG/1
TABLET ORAL
Qty: 30 TABLET | Refills: 0 | Status: SHIPPED | OUTPATIENT
Start: 2018-01-01 | End: 2018-01-01 | Stop reason: SDUPTHER

## 2018-01-01 RX ORDER — DEXTROSE MONOHYDRATE 25 G/50ML
12.5 INJECTION, SOLUTION INTRAVENOUS PRN
Status: DISCONTINUED | OUTPATIENT
Start: 2018-01-01 | End: 2018-01-01 | Stop reason: HOSPADM

## 2018-01-01 RX ORDER — THIAMINE MONONITRATE (VIT B1) 100 MG
TABLET ORAL
Qty: 30 TABLET | Refills: 0 | Status: SHIPPED | OUTPATIENT
Start: 2018-01-01

## 2018-01-01 RX ORDER — ASPIRIN 81 MG/1
81 TABLET ORAL DAILY
Qty: 30 TABLET | Refills: 3 | Status: SHIPPED | OUTPATIENT
Start: 2018-01-01 | End: 2018-01-01 | Stop reason: SDUPTHER

## 2018-01-01 RX ORDER — HEPARIN SODIUM 10000 [USP'U]/100ML
10.1 INJECTION, SOLUTION INTRAVENOUS CONTINUOUS
Status: DISCONTINUED | OUTPATIENT
Start: 2018-01-01 | End: 2018-01-01

## 2018-01-01 RX ORDER — OMEPRAZOLE 20 MG/1
CAPSULE, DELAYED RELEASE ORAL
Qty: 30 CAPSULE | Refills: 0 | Status: SHIPPED | OUTPATIENT
Start: 2018-01-01 | End: 2018-01-01 | Stop reason: ALTCHOICE

## 2018-01-01 RX ORDER — SODIUM CHLORIDE 0.9 % (FLUSH) 0.9 %
10 SYRINGE (ML) INJECTION PRN
Status: DISCONTINUED | OUTPATIENT
Start: 2018-01-01 | End: 2018-01-01 | Stop reason: HOSPADM

## 2018-01-01 RX ORDER — ATORVASTATIN CALCIUM 40 MG/1
TABLET, FILM COATED ORAL
Qty: 30 TABLET | Refills: 2 | Status: SHIPPED | OUTPATIENT
Start: 2018-01-01 | End: 2018-01-01 | Stop reason: SDUPTHER

## 2018-01-01 RX ORDER — FERROUS SULFATE 325(65) MG
TABLET ORAL
Qty: 60 TABLET | Refills: 0 | Status: SHIPPED | OUTPATIENT
Start: 2018-01-01 | End: 2018-01-01 | Stop reason: ALTCHOICE

## 2018-01-01 RX ORDER — MULTIVITAMIN WITH FOLIC ACID 400 MCG
TABLET ORAL
Qty: 90 TABLET | Refills: 0 | Status: SHIPPED | OUTPATIENT
Start: 2018-01-01

## 2018-01-01 RX ORDER — SODIUM CHLORIDE 0.9 % (FLUSH) 0.9 %
10 SYRINGE (ML) INJECTION EVERY 12 HOURS SCHEDULED
Status: DISCONTINUED | OUTPATIENT
Start: 2018-01-01 | End: 2018-01-01 | Stop reason: HOSPADM

## 2018-01-01 RX ORDER — SODIUM CHLORIDE 9 MG/ML
INJECTION, SOLUTION INTRAVENOUS CONTINUOUS
Status: DISCONTINUED | OUTPATIENT
Start: 2018-01-01 | End: 2018-01-01

## 2018-01-01 RX ORDER — 0.9 % SODIUM CHLORIDE 0.9 %
2000 INTRAVENOUS SOLUTION INTRAVENOUS ONCE
Status: COMPLETED | OUTPATIENT
Start: 2018-01-01 | End: 2018-01-01

## 2018-01-01 RX ORDER — LABETALOL HYDROCHLORIDE 5 MG/ML
10 INJECTION, SOLUTION INTRAVENOUS EVERY 6 HOURS PRN
Status: DISCONTINUED | OUTPATIENT
Start: 2018-01-01 | End: 2018-01-01 | Stop reason: HOSPADM

## 2018-01-01 RX ORDER — PENICILLIN V POTASSIUM 250 MG/1
250 TABLET ORAL 3 TIMES DAILY
Qty: 30 TABLET | Refills: 0 | Status: SHIPPED | OUTPATIENT
Start: 2018-01-01 | End: 2018-01-01

## 2018-01-01 RX ORDER — CLOPIDOGREL BISULFATE 75 MG/1
TABLET ORAL
Qty: 30 TABLET | Refills: 0 | Status: SHIPPED | OUTPATIENT
Start: 2018-01-01 | End: 2018-01-01

## 2018-01-01 RX ORDER — OMEPRAZOLE 20 MG/1
20 CAPSULE, DELAYED RELEASE ORAL
COMMUNITY
Start: 2018-01-01

## 2018-01-01 RX ORDER — VECURONIUM BROMIDE 1 MG/ML
INJECTION, POWDER, LYOPHILIZED, FOR SOLUTION INTRAVENOUS
Status: COMPLETED
Start: 2018-01-01 | End: 2018-01-01

## 2018-01-01 RX ORDER — METHYLPREDNISOLONE SODIUM SUCCINATE 40 MG/ML
40 INJECTION, POWDER, LYOPHILIZED, FOR SOLUTION INTRAMUSCULAR; INTRAVENOUS EVERY 8 HOURS
Status: DISCONTINUED | OUTPATIENT
Start: 2018-01-01 | End: 2018-01-01 | Stop reason: HOSPADM

## 2018-01-01 RX ORDER — FERROUS SULFATE 325(65) MG
TABLET ORAL
Qty: 60 TABLET | Refills: 0 | Status: SHIPPED | OUTPATIENT
Start: 2018-01-01 | End: 2018-01-01 | Stop reason: SDUPTHER

## 2018-01-01 RX ORDER — CISATRACURIUM BESYLATE 10 MG/ML
0.15 INJECTION, SOLUTION INTRAVENOUS ONCE
Status: DISCONTINUED | OUTPATIENT
Start: 2018-01-01 | End: 2018-01-01 | Stop reason: CLARIF

## 2018-01-01 RX ORDER — ALBUTEROL SULFATE 2.5 MG/3ML
2.5 SOLUTION RESPIRATORY (INHALATION) EVERY 4 HOURS
Status: DISCONTINUED | OUTPATIENT
Start: 2018-01-01 | End: 2018-01-01

## 2018-01-01 RX ORDER — ALBUTEROL SULFATE 2.5 MG/3ML
5 SOLUTION RESPIRATORY (INHALATION)
Status: DISCONTINUED | OUTPATIENT
Start: 2018-01-01 | End: 2018-01-01

## 2018-01-01 RX ORDER — ATORVASTATIN CALCIUM 40 MG/1
TABLET, FILM COATED ORAL
Qty: 30 TABLET | Refills: 2 | Status: SHIPPED | OUTPATIENT
Start: 2018-01-01

## 2018-01-01 RX ORDER — ONDANSETRON 2 MG/ML
4 INJECTION INTRAMUSCULAR; INTRAVENOUS EVERY 6 HOURS PRN
Status: DISCONTINUED | OUTPATIENT
Start: 2018-01-01 | End: 2018-01-01 | Stop reason: HOSPADM

## 2018-01-01 RX ORDER — LISINOPRIL 5 MG/1
TABLET ORAL
Qty: 60 TABLET | Refills: 2 | Status: SHIPPED | OUTPATIENT
Start: 2018-01-01

## 2018-01-01 RX ORDER — ISOSORBIDE MONONITRATE 30 MG/1
TABLET, EXTENDED RELEASE ORAL
Qty: 30 TABLET | Refills: 2 | Status: SHIPPED | OUTPATIENT
Start: 2018-01-01

## 2018-01-01 RX ORDER — MULTIVITAMIN WITH FOLIC ACID 400 MCG
TABLET ORAL
Qty: 90 TABLET | Refills: 0 | OUTPATIENT
Start: 2018-01-01

## 2018-01-01 RX ORDER — ALBUTEROL SULFATE 2.5 MG/3ML
2.5 SOLUTION RESPIRATORY (INHALATION) EVERY 6 HOURS PRN
Status: DISCONTINUED | OUTPATIENT
Start: 2018-01-01 | End: 2018-01-01 | Stop reason: HOSPADM

## 2018-01-01 RX ORDER — PROPOFOL 10 MG/ML
10 INJECTION, EMULSION INTRAVENOUS
Status: DISCONTINUED | OUTPATIENT
Start: 2018-01-01 | End: 2018-01-01

## 2018-01-01 RX ORDER — LANSOPRAZOLE 15 MG/1
CAPSULE, DELAYED RELEASE ORAL
Qty: 30 CAPSULE | Refills: 0 | Status: SHIPPED | OUTPATIENT
Start: 2018-01-01 | End: 2018-01-01

## 2018-01-01 RX ORDER — PROPOFOL 10 MG/ML
INJECTION, EMULSION INTRAVENOUS
Status: DISPENSED
Start: 2018-01-01 | End: 2018-01-01

## 2018-01-01 RX ORDER — LABETALOL HYDROCHLORIDE 5 MG/ML
5 INJECTION, SOLUTION INTRAVENOUS EVERY 6 HOURS
Status: DISCONTINUED | OUTPATIENT
Start: 2018-01-01 | End: 2018-01-01

## 2018-01-01 RX ORDER — CLOPIDOGREL BISULFATE 75 MG/1
TABLET ORAL
Qty: 30 TABLET | Refills: 2 | Status: SHIPPED | OUTPATIENT
Start: 2018-01-01

## 2018-01-01 RX ORDER — FENTANYL CITRATE 50 UG/ML
50 INJECTION, SOLUTION INTRAMUSCULAR; INTRAVENOUS
Status: DISCONTINUED | OUTPATIENT
Start: 2018-01-01 | End: 2018-01-01 | Stop reason: HOSPADM

## 2018-01-01 RX ORDER — METOPROLOL TARTRATE 100 MG/1
TABLET ORAL
Qty: 30 TABLET | Refills: 2 | Status: SHIPPED | OUTPATIENT
Start: 2018-01-01 | End: 2018-01-01 | Stop reason: SDUPTHER

## 2018-01-01 RX ORDER — ALBUTEROL SULFATE 2.5 MG/3ML
2.5 SOLUTION RESPIRATORY (INHALATION) EVERY 4 HOURS PRN
Status: DISCONTINUED | OUTPATIENT
Start: 2018-01-01 | End: 2018-01-01

## 2018-01-01 RX ORDER — AMLODIPINE BESYLATE 5 MG/1
TABLET ORAL
Qty: 30 TABLET | Refills: 0 | Status: SHIPPED | OUTPATIENT
Start: 2018-01-01 | End: 2018-01-01

## 2018-01-01 RX ORDER — AMLODIPINE BESYLATE 5 MG/1
5 TABLET ORAL DAILY
Status: DISCONTINUED | OUTPATIENT
Start: 2018-01-01 | End: 2018-01-01 | Stop reason: HOSPADM

## 2018-01-01 RX ORDER — METOPROLOL TARTRATE 100 MG/1
TABLET ORAL
Qty: 30 TABLET | Refills: 2 | Status: SHIPPED | OUTPATIENT
Start: 2018-01-01

## 2018-01-01 RX ORDER — BLOOD PRESSURE TEST KIT
KIT MISCELLANEOUS
Qty: 1 KIT | Refills: 0 | Status: SHIPPED | OUTPATIENT
Start: 2018-01-01 | End: 2018-01-01

## 2018-01-01 RX ORDER — LANSOPRAZOLE 15 MG/1
CAPSULE, DELAYED RELEASE ORAL
Qty: 30 CAPSULE | Refills: 0 | Status: SHIPPED | OUTPATIENT
Start: 2018-01-01

## 2018-01-01 RX ORDER — LISINOPRIL 5 MG/1
TABLET ORAL
Qty: 60 TABLET | Refills: 2 | Status: SHIPPED | OUTPATIENT
Start: 2018-01-01 | End: 2018-01-01 | Stop reason: SDUPTHER

## 2018-01-01 RX ORDER — HEPARIN SODIUM 1000 [USP'U]/ML
4000 INJECTION, SOLUTION INTRAVENOUS; SUBCUTANEOUS PRN
Status: DISCONTINUED | OUTPATIENT
Start: 2018-01-01 | End: 2018-01-01

## 2018-01-01 RX ORDER — 0.9 % SODIUM CHLORIDE 0.9 %
1000 INTRAVENOUS SOLUTION INTRAVENOUS ONCE
Status: COMPLETED | OUTPATIENT
Start: 2018-01-01 | End: 2018-01-01

## 2018-01-01 RX ORDER — METOPROLOL TARTRATE 100 MG/1
TABLET ORAL
Qty: 30 TABLET | Refills: 0 | OUTPATIENT
Start: 2018-01-01

## 2018-01-01 RX ORDER — AMLODIPINE BESYLATE 5 MG/1
TABLET ORAL
Qty: 30 TABLET | Refills: 2 | Status: SHIPPED | OUTPATIENT
Start: 2018-01-01

## 2018-01-01 RX ORDER — CLOPIDOGREL BISULFATE 75 MG/1
TABLET ORAL
Qty: 30 TABLET | Refills: 2 | Status: SHIPPED | OUTPATIENT
Start: 2018-01-01 | End: 2018-01-01 | Stop reason: SDUPTHER

## 2018-01-01 RX ORDER — DEXTROSE MONOHYDRATE 50 MG/ML
100 INJECTION, SOLUTION INTRAVENOUS PRN
Status: DISCONTINUED | OUTPATIENT
Start: 2018-01-01 | End: 2018-01-01 | Stop reason: HOSPADM

## 2018-01-01 RX ORDER — NICOTINE POLACRILEX 4 MG
15 LOZENGE BUCCAL PRN
Status: DISCONTINUED | OUTPATIENT
Start: 2018-01-01 | End: 2018-01-01 | Stop reason: HOSPADM

## 2018-01-01 RX ORDER — ISOSORBIDE MONONITRATE 30 MG/1
TABLET, EXTENDED RELEASE ORAL
Qty: 30 TABLET | Refills: 2 | Status: SHIPPED | OUTPATIENT
Start: 2018-01-01 | End: 2018-01-01 | Stop reason: SDUPTHER

## 2018-01-01 RX ORDER — ALBUTEROL SULFATE 2.5 MG/3ML
2.5 SOLUTION RESPIRATORY (INHALATION) 2 TIMES DAILY
Status: DISCONTINUED | OUTPATIENT
Start: 2018-01-01 | End: 2018-01-01 | Stop reason: HOSPADM

## 2018-01-01 RX ORDER — HEPARIN SODIUM 1000 [USP'U]/ML
4000 INJECTION, SOLUTION INTRAVENOUS; SUBCUTANEOUS ONCE
Status: COMPLETED | OUTPATIENT
Start: 2018-01-01 | End: 2018-01-01

## 2018-01-01 RX ORDER — ASPIRIN 81 MG/1
TABLET ORAL
Qty: 30 TABLET | Refills: 0 | Status: SHIPPED | OUTPATIENT
Start: 2018-01-01 | End: 2018-01-01

## 2018-01-01 RX ORDER — HYDRALAZINE HYDROCHLORIDE 20 MG/ML
10 INJECTION INTRAMUSCULAR; INTRAVENOUS EVERY 6 HOURS PRN
Status: DISCONTINUED | OUTPATIENT
Start: 2018-01-01 | End: 2018-01-01 | Stop reason: HOSPADM

## 2018-01-01 RX ORDER — METOPROLOL TARTRATE 5 MG/5ML
5 INJECTION INTRAVENOUS EVERY 6 HOURS PRN
Status: DISCONTINUED | OUTPATIENT
Start: 2018-01-01 | End: 2018-01-01 | Stop reason: HOSPADM

## 2018-01-01 RX ORDER — AMLODIPINE BESYLATE 5 MG/1
TABLET ORAL
Qty: 30 TABLET | Refills: 2 | Status: SHIPPED | OUTPATIENT
Start: 2018-01-01 | End: 2018-01-01 | Stop reason: SDUPTHER

## 2018-01-01 RX ADMIN — HEPARIN SODIUM AND DEXTROSE 8.1 UNITS/KG/HR: 10000; 5 INJECTION INTRAVENOUS at 02:26

## 2018-01-01 RX ADMIN — ALBUTEROL SULFATE 2.5 MG: 2.5 SOLUTION RESPIRATORY (INHALATION) at 03:52

## 2018-01-01 RX ADMIN — FAMOTIDINE 20 MG: 10 INJECTION, SOLUTION INTRAVENOUS at 22:37

## 2018-01-01 RX ADMIN — IPRATROPIUM BROMIDE AND ALBUTEROL SULFATE 1 AMPULE: .5; 3 SOLUTION RESPIRATORY (INHALATION) at 15:16

## 2018-01-01 RX ADMIN — PROPOFOL 25 MCG/KG/MIN: 10 INJECTION, EMULSION INTRAVENOUS at 03:00

## 2018-01-01 RX ADMIN — AMPICILLIN SODIUM AND SULBACTAM SODIUM 1.5 G: 1; .5 INJECTION, POWDER, FOR SOLUTION INTRAMUSCULAR; INTRAVENOUS at 09:02

## 2018-01-01 RX ADMIN — IPRATROPIUM BROMIDE AND ALBUTEROL SULFATE 1 AMPULE: .5; 3 SOLUTION RESPIRATORY (INHALATION) at 12:26

## 2018-01-01 RX ADMIN — AMPICILLIN SODIUM AND SULBACTAM SODIUM 1.5 G: 1; .5 INJECTION, POWDER, FOR SOLUTION INTRAMUSCULAR; INTRAVENOUS at 09:01

## 2018-01-01 RX ADMIN — FAMOTIDINE 20 MG: 10 INJECTION, SOLUTION INTRAVENOUS at 07:51

## 2018-01-01 RX ADMIN — METOPROLOL TARTRATE 5 MG: 1 INJECTION, SOLUTION INTRAVENOUS at 21:48

## 2018-01-01 RX ADMIN — SODIUM CHLORIDE: 9 INJECTION, SOLUTION INTRAVENOUS at 23:19

## 2018-01-01 RX ADMIN — SODIUM CHLORIDE: 4.5 INJECTION, SOLUTION INTRAVENOUS at 07:38

## 2018-01-01 RX ADMIN — FENTANYL CITRATE 100 MCG: 50 INJECTION INTRAMUSCULAR; INTRAVENOUS at 04:13

## 2018-01-01 RX ADMIN — HEPARIN SODIUM AND DEXTROSE 10.1 UNITS/KG/HR: 10000; 5 INJECTION INTRAVENOUS at 21:22

## 2018-01-01 RX ADMIN — FENTANYL CITRATE 100 MCG: 50 INJECTION INTRAMUSCULAR; INTRAVENOUS at 03:30

## 2018-01-01 RX ADMIN — METHYLPREDNISOLONE SODIUM SUCCINATE 40 MG: 40 INJECTION, POWDER, FOR SOLUTION INTRAMUSCULAR; INTRAVENOUS at 15:44

## 2018-01-01 RX ADMIN — PROPOFOL 50 MCG/KG/MIN: 10 INJECTION, EMULSION INTRAVENOUS at 00:30

## 2018-01-01 RX ADMIN — AMPICILLIN SODIUM AND SULBACTAM SODIUM 1.5 G: 1; .5 INJECTION, POWDER, FOR SOLUTION INTRAMUSCULAR; INTRAVENOUS at 21:06

## 2018-01-01 RX ADMIN — FENTANYL CITRATE 100 MCG: 50 INJECTION INTRAMUSCULAR; INTRAVENOUS at 20:39

## 2018-01-01 RX ADMIN — FENTANYL CITRATE 100 MCG: 50 INJECTION INTRAMUSCULAR; INTRAVENOUS at 13:01

## 2018-01-01 RX ADMIN — HEPARIN SODIUM 5000 UNITS: 5000 INJECTION, SOLUTION INTRAVENOUS; SUBCUTANEOUS at 13:53

## 2018-01-01 RX ADMIN — FENTANYL CITRATE 100 MCG: 50 INJECTION INTRAMUSCULAR; INTRAVENOUS at 22:37

## 2018-01-01 RX ADMIN — PROPOFOL 25 MCG/KG/MIN: 10 INJECTION, EMULSION INTRAVENOUS at 18:50

## 2018-01-01 RX ADMIN — IPRATROPIUM BROMIDE AND ALBUTEROL SULFATE 1 AMPULE: .5; 3 SOLUTION RESPIRATORY (INHALATION) at 07:55

## 2018-01-01 RX ADMIN — HEPARIN SODIUM 5000 UNITS: 5000 INJECTION, SOLUTION INTRAVENOUS; SUBCUTANEOUS at 21:48

## 2018-01-01 RX ADMIN — Medication 10 MG: at 10:43

## 2018-01-01 RX ADMIN — CALCIUM GLUCONATE 1 G: 98 INJECTION, SOLUTION INTRAVENOUS at 06:23

## 2018-01-01 RX ADMIN — ALBUTEROL SULFATE 2.5 MG: 2.5 SOLUTION RESPIRATORY (INHALATION) at 04:26

## 2018-01-01 RX ADMIN — METHYLPREDNISOLONE SODIUM SUCCINATE 40 MG: 40 INJECTION, POWDER, FOR SOLUTION INTRAMUSCULAR; INTRAVENOUS at 07:41

## 2018-01-01 RX ADMIN — HEPARIN SODIUM 5000 UNITS: 5000 INJECTION, SOLUTION INTRAVENOUS; SUBCUTANEOUS at 05:54

## 2018-01-01 RX ADMIN — HEPARIN SODIUM 5000 UNITS: 5000 INJECTION, SOLUTION INTRAVENOUS; SUBCUTANEOUS at 13:55

## 2018-01-01 RX ADMIN — IPRATROPIUM BROMIDE AND ALBUTEROL SULFATE 1 AMPULE: .5; 3 SOLUTION RESPIRATORY (INHALATION) at 19:42

## 2018-01-01 RX ADMIN — PROPOFOL 40 MCG/KG/MIN: 10 INJECTION, EMULSION INTRAVENOUS at 03:51

## 2018-01-01 RX ADMIN — AMLODIPINE BESYLATE 5 MG: 5 TABLET ORAL at 08:57

## 2018-01-01 RX ADMIN — SODIUM CHLORIDE: 4.5 INJECTION, SOLUTION INTRAVENOUS at 10:37

## 2018-01-01 RX ADMIN — Medication 10 ML: at 20:27

## 2018-01-01 RX ADMIN — PROPOFOL 30 MCG/KG/MIN: 10 INJECTION, EMULSION INTRAVENOUS at 02:16

## 2018-01-01 RX ADMIN — FENTANYL CITRATE 100 MCG: 50 INJECTION INTRAMUSCULAR; INTRAVENOUS at 12:00

## 2018-01-01 RX ADMIN — SODIUM CHLORIDE 1000 ML: 9 INJECTION, SOLUTION INTRAVENOUS at 17:52

## 2018-01-01 RX ADMIN — IPRATROPIUM BROMIDE AND ALBUTEROL SULFATE 1 AMPULE: .5; 3 SOLUTION RESPIRATORY (INHALATION) at 15:55

## 2018-01-01 RX ADMIN — SODIUM CHLORIDE 1000 ML: 9 INJECTION, SOLUTION INTRAVENOUS at 20:14

## 2018-01-01 RX ADMIN — PROPOFOL 35 MCG/KG/MIN: 10 INJECTION, EMULSION INTRAVENOUS at 01:32

## 2018-01-01 RX ADMIN — FENTANYL CITRATE 100 MCG: 50 INJECTION INTRAMUSCULAR; INTRAVENOUS at 20:25

## 2018-01-01 RX ADMIN — HYDRALAZINE HYDROCHLORIDE 10 MG: 20 INJECTION INTRAMUSCULAR; INTRAVENOUS at 01:38

## 2018-01-01 RX ADMIN — CISATRACURIUM BESYLATE 2 MCG/KG/MIN: 10 INJECTION, SOLUTION INTRAVENOUS at 18:52

## 2018-01-01 RX ADMIN — ALBUTEROL SULFATE 5 MG: 5 SOLUTION RESPIRATORY (INHALATION) at 15:34

## 2018-01-01 RX ADMIN — INSULIN LISPRO 1 UNITS: 100 INJECTION, SOLUTION INTRAVENOUS; SUBCUTANEOUS at 00:25

## 2018-01-01 RX ADMIN — IPRATROPIUM BROMIDE AND ALBUTEROL SULFATE 1 AMPULE: .5; 3 SOLUTION RESPIRATORY (INHALATION) at 19:23

## 2018-01-01 RX ADMIN — METOPROLOL TARTRATE 5 MG: 1 INJECTION, SOLUTION INTRAVENOUS at 06:16

## 2018-01-01 RX ADMIN — PROPOFOL 30 MCG/KG/MIN: 10 INJECTION, EMULSION INTRAVENOUS at 13:39

## 2018-01-01 RX ADMIN — SODIUM CHLORIDE: 4.5 INJECTION, SOLUTION INTRAVENOUS at 23:01

## 2018-01-01 RX ADMIN — VECURONIUM BROMIDE 10 MG: 1 INJECTION, POWDER, LYOPHILIZED, FOR SOLUTION INTRAVENOUS at 23:15

## 2018-01-01 RX ADMIN — CISATRACURIUM BESYLATE 2.5 MCG/KG/MIN: 10 INJECTION, SOLUTION INTRAVENOUS at 23:10

## 2018-01-01 RX ADMIN — PROPOFOL 25 MCG/KG/MIN: 10 INJECTION, EMULSION INTRAVENOUS at 07:51

## 2018-01-01 RX ADMIN — Medication 10 ML: at 09:13

## 2018-01-01 RX ADMIN — PROPOFOL 30 MCG/KG/MIN: 10 INJECTION, EMULSION INTRAVENOUS at 14:40

## 2018-01-01 RX ADMIN — AMPICILLIN SODIUM AND SULBACTAM SODIUM 1.5 G: 1; .5 INJECTION, POWDER, FOR SOLUTION INTRAMUSCULAR; INTRAVENOUS at 04:07

## 2018-01-01 RX ADMIN — FENTANYL CITRATE 100 MCG: 50 INJECTION INTRAMUSCULAR; INTRAVENOUS at 17:57

## 2018-01-01 RX ADMIN — PROPOFOL 10 MCG/KG/MIN: 10 INJECTION, EMULSION INTRAVENOUS at 20:34

## 2018-01-01 RX ADMIN — ALBUTEROL SULFATE 2.5 MG: 2.5 SOLUTION RESPIRATORY (INHALATION) at 23:30

## 2018-01-01 RX ADMIN — Medication 10 ML: at 21:33

## 2018-01-01 RX ADMIN — SODIUM CHLORIDE: 4.5 INJECTION, SOLUTION INTRAVENOUS at 03:44

## 2018-01-01 RX ADMIN — HEPARIN SODIUM 4000 UNITS: 1000 INJECTION, SOLUTION INTRAVENOUS; SUBCUTANEOUS at 21:22

## 2018-01-01 RX ADMIN — FENTANYL CITRATE 100 MCG: 50 INJECTION INTRAMUSCULAR; INTRAVENOUS at 18:49

## 2018-01-01 RX ADMIN — ALBUTEROL SULFATE 2.5 MG: 2.5 SOLUTION RESPIRATORY (INHALATION) at 00:24

## 2018-01-01 RX ADMIN — FENTANYL CITRATE 100 MCG: 50 INJECTION INTRAMUSCULAR; INTRAVENOUS at 15:42

## 2018-01-01 RX ADMIN — FENTANYL CITRATE 100 MCG: 50 INJECTION INTRAMUSCULAR; INTRAVENOUS at 14:17

## 2018-01-01 RX ADMIN — SODIUM CHLORIDE: 4.5 INJECTION, SOLUTION INTRAVENOUS at 07:46

## 2018-01-01 RX ADMIN — HEPARIN SODIUM 5000 UNITS: 5000 INJECTION, SOLUTION INTRAVENOUS; SUBCUTANEOUS at 06:01

## 2018-01-01 RX ADMIN — DEXAMETHASONE SODIUM PHOSPHATE 10 MG: 10 INJECTION INTRAMUSCULAR; INTRAVENOUS at 15:35

## 2018-01-01 RX ADMIN — ALBUTEROL SULFATE 2.5 MG: 2.5 SOLUTION RESPIRATORY (INHALATION) at 23:32

## 2018-01-01 RX ADMIN — IPRATROPIUM BROMIDE AND ALBUTEROL SULFATE 1 AMPULE: .5; 3 SOLUTION RESPIRATORY (INHALATION) at 11:24

## 2018-01-01 RX ADMIN — FENTANYL CITRATE 100 MCG: 50 INJECTION INTRAMUSCULAR; INTRAVENOUS at 16:36

## 2018-01-01 RX ADMIN — FENTANYL CITRATE 100 MCG: 50 INJECTION INTRAMUSCULAR; INTRAVENOUS at 09:49

## 2018-01-01 RX ADMIN — FENTANYL CITRATE 100 MCG: 50 INJECTION INTRAMUSCULAR; INTRAVENOUS at 07:51

## 2018-01-01 RX ADMIN — IPRATROPIUM BROMIDE AND ALBUTEROL SULFATE 1 AMPULE: .5; 3 SOLUTION RESPIRATORY (INHALATION) at 21:19

## 2018-01-01 RX ADMIN — SODIUM CHLORIDE 500 ML: 9 INJECTION, SOLUTION INTRAVENOUS at 06:55

## 2018-01-01 RX ADMIN — PROPOFOL 35 MCG/KG/MIN: 10 INJECTION, EMULSION INTRAVENOUS at 16:21

## 2018-01-01 RX ADMIN — SODIUM CHLORIDE: 9 INJECTION, SOLUTION INTRAVENOUS at 14:39

## 2018-01-01 RX ADMIN — PROPOFOL 35 MCG/KG/MIN: 10 INJECTION, EMULSION INTRAVENOUS at 20:50

## 2018-01-01 RX ADMIN — PROPOFOL 35 MCG/KG/MIN: 10 INJECTION, EMULSION INTRAVENOUS at 06:52

## 2018-01-01 RX ADMIN — FENTANYL CITRATE 100 MCG: 50 INJECTION INTRAMUSCULAR; INTRAVENOUS at 10:06

## 2018-01-01 RX ADMIN — AMPICILLIN SODIUM AND SULBACTAM SODIUM 1.5 G: 1; .5 INJECTION, POWDER, FOR SOLUTION INTRAMUSCULAR; INTRAVENOUS at 21:53

## 2018-01-01 RX ADMIN — METOPROLOL TARTRATE 5 MG: 1 INJECTION, SOLUTION INTRAVENOUS at 05:06

## 2018-01-01 RX ADMIN — ALBUTEROL SULFATE 2.5 MG: 2.5 SOLUTION RESPIRATORY (INHALATION) at 23:48

## 2018-01-01 RX ADMIN — AMPICILLIN SODIUM AND SULBACTAM SODIUM 1.5 G: 1; .5 INJECTION, POWDER, FOR SOLUTION INTRAMUSCULAR; INTRAVENOUS at 20:38

## 2018-01-01 RX ADMIN — METHYLPREDNISOLONE SODIUM SUCCINATE 40 MG: 40 INJECTION, POWDER, FOR SOLUTION INTRAMUSCULAR; INTRAVENOUS at 23:26

## 2018-01-01 RX ADMIN — ALBUTEROL SULFATE 2.5 MG: 2.5 SOLUTION RESPIRATORY (INHALATION) at 03:12

## 2018-01-01 RX ADMIN — SODIUM CHLORIDE 500 ML: 9 INJECTION, SOLUTION INTRAVENOUS at 14:20

## 2018-01-01 RX ADMIN — FENTANYL CITRATE 100 MCG: 50 INJECTION INTRAMUSCULAR; INTRAVENOUS at 00:44

## 2018-01-01 RX ADMIN — PROPOFOL 50 MCG/KG/MIN: 10 INJECTION, EMULSION INTRAVENOUS at 07:40

## 2018-01-01 RX ADMIN — AMPICILLIN SODIUM AND SULBACTAM SODIUM 1.5 G: 1; .5 INJECTION, POWDER, FOR SOLUTION INTRAMUSCULAR; INTRAVENOUS at 20:18

## 2018-01-01 RX ADMIN — CALCIUM GLUCONATE 2 G: 98 INJECTION, SOLUTION INTRAVENOUS at 08:53

## 2018-01-01 RX ADMIN — FENTANYL CITRATE 100 MCG: 50 INJECTION INTRAMUSCULAR; INTRAVENOUS at 22:32

## 2018-01-01 RX ADMIN — SODIUM CHLORIDE 500 ML: 9 INJECTION, SOLUTION INTRAVENOUS at 02:00

## 2018-01-01 RX ADMIN — PROPOFOL 45 MCG/KG/MIN: 10 INJECTION, EMULSION INTRAVENOUS at 14:47

## 2018-01-01 RX ADMIN — CISATRACURIUM BESYLATE 4 MCG/KG/MIN: 10 INJECTION, SOLUTION INTRAVENOUS at 21:11

## 2018-01-01 RX ADMIN — PROPOFOL 20 MCG/KG/MIN: 10 INJECTION, EMULSION INTRAVENOUS at 04:46

## 2018-01-01 RX ADMIN — IPRATROPIUM BROMIDE AND ALBUTEROL SULFATE 1 AMPULE: .5; 3 SOLUTION RESPIRATORY (INHALATION) at 11:10

## 2018-01-01 RX ADMIN — WATER 10 ML: 1 INJECTION INTRAMUSCULAR; INTRAVENOUS; SUBCUTANEOUS at 23:18

## 2018-01-01 RX ADMIN — FENTANYL CITRATE 100 MCG: 50 INJECTION INTRAMUSCULAR; INTRAVENOUS at 02:35

## 2018-01-01 RX ADMIN — FENTANYL CITRATE 50 MCG: 50 INJECTION INTRAMUSCULAR; INTRAVENOUS at 21:34

## 2018-01-01 RX ADMIN — INSULIN LISPRO 1 UNITS: 100 INJECTION, SOLUTION INTRAVENOUS; SUBCUTANEOUS at 00:20

## 2018-01-01 RX ADMIN — HEPARIN SODIUM 2000 UNITS: 1000 INJECTION, SOLUTION INTRAVENOUS; SUBCUTANEOUS at 14:56

## 2018-01-01 RX ADMIN — CISATRACURIUM BESYLATE 4 MCG/KG/MIN: 10 INJECTION, SOLUTION INTRAVENOUS at 04:34

## 2018-01-01 RX ADMIN — SODIUM CHLORIDE: 9 INJECTION, SOLUTION INTRAVENOUS at 19:00

## 2018-01-01 RX ADMIN — METHYLPREDNISOLONE SODIUM SUCCINATE 40 MG: 40 INJECTION, POWDER, FOR SOLUTION INTRAMUSCULAR; INTRAVENOUS at 06:23

## 2018-01-01 RX ADMIN — Medication 10 ML: at 07:45

## 2018-01-01 RX ADMIN — CISATRACURIUM BESYLATE 2 MCG/KG/MIN: 10 INJECTION, SOLUTION INTRAVENOUS at 22:12

## 2018-01-01 RX ADMIN — FAMOTIDINE 20 MG: 10 INJECTION, SOLUTION INTRAVENOUS at 09:01

## 2018-01-01 RX ADMIN — Medication 10 MG: at 17:05

## 2018-01-01 RX ADMIN — FENTANYL CITRATE 100 MCG: 50 INJECTION INTRAMUSCULAR; INTRAVENOUS at 19:28

## 2018-01-01 RX ADMIN — FAMOTIDINE 20 MG: 10 INJECTION, SOLUTION INTRAVENOUS at 07:41

## 2018-01-01 RX ADMIN — INSULIN LISPRO 1 UNITS: 100 INJECTION, SOLUTION INTRAVENOUS; SUBCUTANEOUS at 13:46

## 2018-01-01 RX ADMIN — SODIUM CHLORIDE: 9 INJECTION, SOLUTION INTRAVENOUS at 09:26

## 2018-01-01 RX ADMIN — LABETALOL HYDROCHLORIDE 5 MG: 5 INJECTION INTRAVENOUS at 04:01

## 2018-01-01 RX ADMIN — SODIUM CHLORIDE: 9 INJECTION, SOLUTION INTRAVENOUS at 20:13

## 2018-01-01 RX ADMIN — FENTANYL CITRATE 100 MCG: 50 INJECTION INTRAMUSCULAR; INTRAVENOUS at 06:27

## 2018-01-01 RX ADMIN — IPRATROPIUM BROMIDE AND ALBUTEROL SULFATE 1 AMPULE: .5; 3 SOLUTION RESPIRATORY (INHALATION) at 08:29

## 2018-01-01 RX ADMIN — HEPARIN SODIUM 5000 UNITS: 5000 INJECTION, SOLUTION INTRAVENOUS; SUBCUTANEOUS at 16:24

## 2018-01-01 RX ADMIN — FAMOTIDINE 20 MG: 10 INJECTION, SOLUTION INTRAVENOUS at 09:08

## 2018-01-01 RX ADMIN — LABETALOL HYDROCHLORIDE 5 MG: 5 INJECTION INTRAVENOUS at 00:18

## 2018-01-01 RX ADMIN — METHYLPREDNISOLONE SODIUM SUCCINATE 40 MG: 40 INJECTION, POWDER, FOR SOLUTION INTRAMUSCULAR; INTRAVENOUS at 23:04

## 2018-01-01 RX ADMIN — INSULIN LISPRO 1 UNITS: 100 INJECTION, SOLUTION INTRAVENOUS; SUBCUTANEOUS at 11:42

## 2018-01-01 RX ADMIN — PROPOFOL 45 MCG/KG/MIN: 10 INJECTION, EMULSION INTRAVENOUS at 11:22

## 2018-01-01 RX ADMIN — Medication 10 ML: at 20:16

## 2018-01-01 RX ADMIN — CISATRACURIUM BESYLATE 2 MCG/KG/MIN: 10 INJECTION, SOLUTION INTRAVENOUS at 03:35

## 2018-01-01 RX ADMIN — METHYLPREDNISOLONE SODIUM SUCCINATE 40 MG: 40 INJECTION, POWDER, FOR SOLUTION INTRAMUSCULAR; INTRAVENOUS at 06:30

## 2018-01-01 RX ADMIN — SODIUM CHLORIDE 1000 ML: 4.5 INJECTION, SOLUTION INTRAVENOUS at 23:02

## 2018-01-01 RX ADMIN — METHYLPREDNISOLONE SODIUM SUCCINATE 40 MG: 40 INJECTION, POWDER, FOR SOLUTION INTRAMUSCULAR; INTRAVENOUS at 01:24

## 2018-01-01 RX ADMIN — PROPOFOL 35 MCG/KG/MIN: 10 INJECTION, EMULSION INTRAVENOUS at 12:15

## 2018-01-01 RX ADMIN — IPRATROPIUM BROMIDE 0.5 MG: 0.5 SOLUTION RESPIRATORY (INHALATION) at 15:34

## 2018-01-01 RX ADMIN — HEPARIN SODIUM 5000 UNITS: 5000 INJECTION, SOLUTION INTRAVENOUS; SUBCUTANEOUS at 22:30

## 2018-01-01 RX ADMIN — PROPOFOL 40 MCG/KG/MIN: 10 INJECTION, EMULSION INTRAVENOUS at 00:09

## 2018-01-01 RX ADMIN — METOPROLOL TARTRATE 5 MG: 1 INJECTION, SOLUTION INTRAVENOUS at 15:16

## 2018-01-01 RX ADMIN — IOPAMIDOL 75 ML: 755 INJECTION, SOLUTION INTRAVENOUS at 15:19

## 2018-01-01 RX ADMIN — SODIUM CHLORIDE: 9 INJECTION, SOLUTION INTRAVENOUS at 16:45

## 2018-01-01 RX ADMIN — FENTANYL CITRATE 100 MCG: 50 INJECTION INTRAMUSCULAR; INTRAVENOUS at 14:35

## 2018-01-01 RX ADMIN — PROPOFOL 20 MCG/KG/MIN: 10 INJECTION, EMULSION INTRAVENOUS at 15:49

## 2018-01-01 RX ADMIN — Medication 10 ML: at 21:06

## 2018-01-01 RX ADMIN — METHYLPREDNISOLONE SODIUM SUCCINATE 40 MG: 40 INJECTION, POWDER, FOR SOLUTION INTRAMUSCULAR; INTRAVENOUS at 07:50

## 2018-01-01 RX ADMIN — CISATRACURIUM BESYLATE 4 MCG/KG/MIN: 10 INJECTION, SOLUTION INTRAVENOUS at 12:45

## 2018-01-01 RX ADMIN — IPRATROPIUM BROMIDE AND ALBUTEROL SULFATE 1 AMPULE: .5; 3 SOLUTION RESPIRATORY (INHALATION) at 15:37

## 2018-01-01 RX ADMIN — AMPICILLIN SODIUM AND SULBACTAM SODIUM 1.5 G: 1; .5 INJECTION, POWDER, FOR SOLUTION INTRAMUSCULAR; INTRAVENOUS at 08:53

## 2018-01-01 RX ADMIN — INSULIN LISPRO 1 UNITS: 100 INJECTION, SOLUTION INTRAVENOUS; SUBCUTANEOUS at 05:51

## 2018-01-01 RX ADMIN — CISATRACURIUM BESYLATE 4 MCG/KG/MIN: 10 INJECTION, SOLUTION INTRAVENOUS at 13:48

## 2018-01-01 RX ADMIN — ALBUTEROL SULFATE 2.5 MG: 2.5 SOLUTION RESPIRATORY (INHALATION) at 03:42

## 2018-01-01 RX ADMIN — METHYLPREDNISOLONE SODIUM SUCCINATE 40 MG: 40 INJECTION, POWDER, FOR SOLUTION INTRAMUSCULAR; INTRAVENOUS at 14:46

## 2018-01-01 RX ADMIN — PROPOFOL 45 MCG/KG/MIN: 10 INJECTION, EMULSION INTRAVENOUS at 19:00

## 2018-01-01 RX ADMIN — AMPICILLIN SODIUM AND SULBACTAM SODIUM 1.5 G: 1; .5 INJECTION, POWDER, FOR SOLUTION INTRAMUSCULAR; INTRAVENOUS at 20:50

## 2018-01-01 RX ADMIN — METHYLPREDNISOLONE SODIUM SUCCINATE 40 MG: 40 INJECTION, POWDER, FOR SOLUTION INTRAMUSCULAR; INTRAVENOUS at 16:16

## 2018-01-01 RX ADMIN — ALBUTEROL SULFATE 2.5 MG: 2.5 SOLUTION RESPIRATORY (INHALATION) at 03:40

## 2018-01-01 RX ADMIN — IPRATROPIUM BROMIDE AND ALBUTEROL SULFATE 1 AMPULE: .5; 3 SOLUTION RESPIRATORY (INHALATION) at 07:46

## 2018-01-01 RX ADMIN — IPRATROPIUM BROMIDE AND ALBUTEROL SULFATE 1 AMPULE: .5; 3 SOLUTION RESPIRATORY (INHALATION) at 19:50

## 2018-01-01 RX ADMIN — FENTANYL CITRATE 100 MCG: 50 INJECTION INTRAMUSCULAR; INTRAVENOUS at 04:21

## 2018-01-01 RX ADMIN — METHYLPREDNISOLONE SODIUM SUCCINATE 40 MG: 40 INJECTION, POWDER, FOR SOLUTION INTRAMUSCULAR; INTRAVENOUS at 23:21

## 2018-01-01 RX ADMIN — LABETALOL HYDROCHLORIDE 5 MG: 5 INJECTION INTRAVENOUS at 06:00

## 2018-01-01 RX ADMIN — IPRATROPIUM BROMIDE AND ALBUTEROL SULFATE 1 AMPULE: .5; 3 SOLUTION RESPIRATORY (INHALATION) at 15:38

## 2018-01-01 RX ADMIN — HEPARIN SODIUM 5000 UNITS: 5000 INJECTION, SOLUTION INTRAVENOUS; SUBCUTANEOUS at 21:45

## 2018-01-01 RX ADMIN — FENTANYL CITRATE 100 MCG: 50 INJECTION INTRAMUSCULAR; INTRAVENOUS at 15:18

## 2018-01-01 RX ADMIN — FENTANYL CITRATE 100 MCG: 50 INJECTION INTRAMUSCULAR; INTRAVENOUS at 03:03

## 2018-01-01 RX ADMIN — IPRATROPIUM BROMIDE AND ALBUTEROL SULFATE 1 AMPULE: .5; 3 SOLUTION RESPIRATORY (INHALATION) at 07:44

## 2018-01-01 RX ADMIN — IPRATROPIUM BROMIDE AND ALBUTEROL SULFATE 1 AMPULE: .5; 3 SOLUTION RESPIRATORY (INHALATION) at 19:21

## 2018-01-01 RX ADMIN — METHYLPREDNISOLONE SODIUM SUCCINATE 40 MG: 40 INJECTION, POWDER, FOR SOLUTION INTRAMUSCULAR; INTRAVENOUS at 22:37

## 2018-01-01 RX ADMIN — SODIUM CHLORIDE: 4.5 INJECTION, SOLUTION INTRAVENOUS at 16:39

## 2018-01-01 RX ADMIN — FENTANYL CITRATE 100 MCG: 50 INJECTION INTRAMUSCULAR; INTRAVENOUS at 21:57

## 2018-01-01 RX ADMIN — METOPROLOL TARTRATE 5 MG: 1 INJECTION, SOLUTION INTRAVENOUS at 03:39

## 2018-01-01 RX ADMIN — METHYLPREDNISOLONE SODIUM SUCCINATE 40 MG: 40 INJECTION, POWDER, FOR SOLUTION INTRAMUSCULAR; INTRAVENOUS at 15:16

## 2018-01-01 RX ADMIN — PROPOFOL 45 MCG/KG/MIN: 10 INJECTION, EMULSION INTRAVENOUS at 21:50

## 2018-01-01 RX ADMIN — FENTANYL CITRATE 50 MCG: 50 INJECTION INTRAMUSCULAR; INTRAVENOUS at 21:46

## 2018-01-01 RX ADMIN — IPRATROPIUM BROMIDE AND ALBUTEROL SULFATE 1 AMPULE: .5; 3 SOLUTION RESPIRATORY (INHALATION) at 11:14

## 2018-01-01 RX ADMIN — FENTANYL CITRATE 100 MCG: 50 INJECTION INTRAMUSCULAR; INTRAVENOUS at 03:13

## 2018-01-01 RX ADMIN — ALBUTEROL SULFATE 2.5 MG: 2.5 SOLUTION RESPIRATORY (INHALATION) at 23:25

## 2018-01-01 RX ADMIN — FENTANYL CITRATE 100 MCG: 50 INJECTION INTRAMUSCULAR; INTRAVENOUS at 00:16

## 2018-01-01 RX ADMIN — METOPROLOL TARTRATE 5 MG: 1 INJECTION, SOLUTION INTRAVENOUS at 23:06

## 2018-01-01 RX ADMIN — Medication 10 MG: at 23:15

## 2018-01-01 RX ADMIN — METOPROLOL TARTRATE 5 MG: 1 INJECTION, SOLUTION INTRAVENOUS at 02:11

## 2018-01-01 ASSESSMENT — PULMONARY FUNCTION TESTS
PIF_VALUE: 29
PIF_VALUE: 32
PIF_VALUE: 28
PIF_VALUE: 27
PIF_VALUE: 28
PIF_VALUE: 30
PIF_VALUE: 32
PIF_VALUE: 27
PIF_VALUE: 27
PIF_VALUE: 29
PIF_VALUE: 32
PIF_VALUE: 31
PIF_VALUE: 31
PIF_VALUE: 36
PIF_VALUE: 31
PIF_VALUE: 28
PIF_VALUE: 31
PIF_VALUE: 29
PIF_VALUE: 30
PIF_VALUE: 27
PIF_VALUE: 29
PIF_VALUE: 28
PIF_VALUE: 29
PIF_VALUE: 28
PIF_VALUE: 30
PIF_VALUE: 29
PIF_VALUE: 30
PIF_VALUE: 28
PIF_VALUE: 32
PIF_VALUE: 30
PIF_VALUE: 25
PIF_VALUE: 28
PIF_VALUE: 29
PIF_VALUE: 28
PIF_VALUE: 31
PIF_VALUE: 29
PIF_VALUE: 32
PIF_VALUE: 30
PIF_VALUE: 32
PIF_VALUE: 29
PIF_VALUE: 30
PIF_VALUE: 29
PIF_VALUE: 26
PIF_VALUE: 28
PIF_VALUE: 31
PIF_VALUE: 27
PIF_VALUE: 29
PIF_VALUE: 32
PIF_VALUE: 30
PIF_VALUE: 30
PIF_VALUE: 27
PIF_VALUE: 29
PIF_VALUE: 30
PIF_VALUE: 29
PIF_VALUE: 30
PIF_VALUE: 32
PIF_VALUE: 27
PIF_VALUE: 28
PIF_VALUE: 29
PIF_VALUE: 31
PIF_VALUE: 32
PIF_VALUE: 14
PIF_VALUE: 30
PIF_VALUE: 38
PIF_VALUE: 31
PIF_VALUE: 29
PIF_VALUE: 30
PIF_VALUE: 32
PIF_VALUE: 31
PIF_VALUE: 30
PIF_VALUE: 27
PIF_VALUE: 28
PIF_VALUE: 32
PIF_VALUE: 31
PIF_VALUE: 27
PIF_VALUE: 32
PIF_VALUE: 28
PIF_VALUE: 32
PIF_VALUE: 30

## 2018-01-01 ASSESSMENT — ENCOUNTER SYMPTOMS
COLOR CHANGE: 0
ABDOMINAL PAIN: 0
APNEA: 0
EYE DISCHARGE: 0
ABDOMINAL DISTENTION: 0
ABDOMINAL DISTENTION: 0
EYE PAIN: 0
CONSTIPATION: 0
TROUBLE SWALLOWING: 0
ABDOMINAL DISTENTION: 0
ABDOMINAL PAIN: 0
SHORTNESS OF BREATH: 0
BLOOD IN STOOL: 0
DIARRHEA: 0
SHORTNESS OF BREATH: 0
BACK PAIN: 0
EYE ITCHING: 0
VOMITING: 0
EYE REDNESS: 0
NAUSEA: 0
BACK PAIN: 0
CHEST TIGHTNESS: 0
APNEA: 0
COUGH: 0
RHINORRHEA: 0
COUGH: 0
CHEST TIGHTNESS: 0

## 2018-01-01 ASSESSMENT — PAIN SCALES - GENERAL
PAINLEVEL_OUTOF10: 0
PAINLEVEL_OUTOF10: 0

## 2018-01-01 ASSESSMENT — PATIENT HEALTH QUESTIONNAIRE - PHQ9
SUM OF ALL RESPONSES TO PHQ QUESTIONS 1-9: 0
1. LITTLE INTEREST OR PLEASURE IN DOING THINGS: 0
SUM OF ALL RESPONSES TO PHQ QUESTIONS 1-9: 0
SUM OF ALL RESPONSES TO PHQ9 QUESTIONS 1 & 2: 0
2. FEELING DOWN, DEPRESSED OR HOPELESS: 0

## 2018-01-10 NOTE — PATIENT INSTRUCTIONS
Thank you for letting us take care of you today. We hope all your questions were addressed. If a question was overlooked or something else comes to mind after you return home, please contact a member of your Care Team listed below. Please make sure you have a routine office visit set up to follow-up on [address]. Your Care Team at [place] [address] is Team #3  Howard Ling MD (Faculty)  Nurys Garcia MD (Faculty  Russ MD Joseph (Resident)  Aris Call MD (Resident)  Emerita Alcazar MD (Resident)  Renetta Tamez MD (Resident)  MANUEL Estrada, SHAE Obando, SHAE Olsen ([facility])  Tay Barger RN, ([address] [facility])  Noni De Leon, Ph.D., (Behavioral Services)  Torin Navarro, [facility] (Clinical Pharmacist)     Office phone number: [phone]    If you need to get in right away due to illness, please be advised we have \"Same Day\" appointments available Monday-Friday. Please call us at [phone] option #1 to schedule your \"Same Day\" appointment.

## 2018-01-10 NOTE — PROGRESS NOTES
299 07/12/2016    CHOL 143 10/15/2015    TRIG 102 10/15/2015    HDL 46 09/20/2017    ALT 17 02/21/2013    AST 16 02/21/2013     10/15/2015    K 4.5 10/15/2015     10/15/2015    CREATININE 1.0 07/12/2016    BUN 14 10/15/2015    CO2 25 10/15/2015    LABA1C 5.7 10/30/2017    LABMICR 19 04/22/2016     Lab Results   Component Value Date    CALCIUM 9.0 10/15/2015    PHOS 3.6 10/15/2015     Lab Results   Component Value Date    LDLCHOLESTEROL 72 09/20/2017         1. Pain, dental  - penicillin v potassium (VEETID) 250 MG tablet; Take 1 tablet by mouth 3 times daily for 10 days  Dispense: 30 tablet; Refill: 0  Pt explained that if she is having multiple tooth extraction only in face of that aspirin and plavix will need to be stopped. Pt not sure how many teeth going to be pulled out. States atleast three. No heart procedure in last 1 year,. Pt daughter with her voiced understanding        3. Healthcare maintenance    - Basic Metabolic Panel; Future  - DANY DIGITAL SCREEN W OR WO CAD BILATERAL; Future  - POCT Fecal Immunochemical Test (FIT); Future    4. Encounter for screening mammogram for malignant neoplasm of breast   - [facility] DIGITAL SCREEN W OR WO CAD BILATERAL; Future        Requested Prescriptions     Signed Prescriptions Disp Refills    penicillin v potassium (VEETID) 250 MG tablet 30 tablet 0     Sig: Take 1 tablet by mouth 3 times daily for 10 days       There are no discontinued medications. Genet received counseling on the following healthy behaviors: nutrition, exercise and medication adherence    Patient given educational materials : see patient instruction     I have instructed Genet to complete a self tracking handout on Weights and instructed them to bring it with them to her next appointment. Discussed use, benefit, and side effects of prescribed medications. Barriers to medication compliance addressed. All patient questions answered. Pt voiced understanding.      Return in about 3 months (around 4/10/2018) for HTN.

## 2018-01-10 NOTE — PROGRESS NOTES
Attending Physician Statement  I  have discussed the care of Ami Smith including pertinent history and exam findings with the resident. I agree with the assessment, plan and orders as documented by the resident. /80 (Site: Left Arm, Position: Sitting, Cuff Size: Medium Adult) Comment: manual  Pulse 87   Temp 98.9 °F (37.2 °C) (Temporal)   Ht 5' 5.75\" (1.67 m)   Wt 217 lb (98.4 kg)   BMI 35.29 kg/m²    BP Readings from Last 3 Encounters:   01/10/18 134/80   10/30/17 134/78   08/25/17 125/73     Wt Readings from Last 3 Encounters:   01/10/18 217 lb (98.4 kg)   10/30/17 218 lb (98.9 kg)   08/25/17 213 lb 3.2 oz (96.7 kg)         1. Pain, dental  penicillin v potassium (VEETID) 250 MG tablet   2. Essential tremor     3. Healthcare maintenance  Basic Metabolic Panel    DANY DIGITAL SCREEN W OR WO CAD BILATERAL    POCT Fecal Immunochemical Test (FIT)   4. Encounter for screening mammogram for malignant neoplasm of breast   DANY DIGITAL SCREEN W OR WO CAD BILATERAL     Condition warrants cessation of antiplatelet and anticoagulation based upon proposed dental procedures. Patient's condition for which she was previously treated with the above-noted medications was over a year ago.       Latanya Morales DO 1/10/2018 4:56 PM

## 2018-01-19 NOTE — TELEPHONE ENCOUNTER
Candace Landeros with the PT\"s dentist called with questions regarding the antibiotic directions. Writer called and LVM with.

## 2018-02-15 NOTE — TELEPHONE ENCOUNTER
Headache     S/P CABG (coronary artery bypass graft)     Screening for condition     GERD (gastroesophageal reflux disease)     Diabetes mellitus, type 2 ([facility]

## 2018-03-05 NOTE — TELEPHONE ENCOUNTER
Next Visit Date:  No future appointments. Health Maintenance   Topic Date Due    Shingles Vaccine (1 of 2 - 2 Dose Series) 10/16/1998    Diabetic retinal exam  04/13/2016    Diabetic foot exam  11/25/2016    Colon Cancer Screen FIT/FOBT  10/18/2017    Lipid screen  09/20/2018    A1C test (Diabetic or Prediabetic)  10/30/2018    Potassium monitoring  01/10/2019    Creatinine monitoring  01/10/2019    Breast cancer screen  02/26/2020    DTaP/Tdap/Td vaccine (2 - Td) 08/25/2027    DEXA (modify frequency per FRAX score)  Completed    Flu vaccine  Completed    Pneumococcal low/med risk  Completed    Hepatitis C screen  Completed       Hemoglobin A1C (%)   Date Value   10/30/2017 5.7   10/07/2016 5.8   11/25/2015 5.6             ( goal A1C is < 7)   Microalb/Crt.  Ratio (mcg/mg creat)   Date Value   04/22/2016 19     LDL Cholesterol (mg/dL)   Date Value   09/20/2017 72       (goal LDL is <100)   AST (U/L)   Date Value   02/21/2013 16     ALT (U/L)   Date Value   02/21/2013 17     BUN (mg/dL)   Date Value   01/10/2018 15     BP Readings from Last 3 Encounters:   01/10/18 134/80   10/30/17 134/78   08/25/17 125/73          (goal 120/80)    All Future Testing planned in [facility]  Lab Frequency Next Occurrence   POCT Fecal Immunochemical Test (FIT) Once 03/08/2018               Patient Active Problem List:     Type 2 diabetes mellitus (HCC)     Asthma     CAD (coronary artery disease)     Hyperlipemia     HTN (hypertension)     Anemia     Schizophrenia, paranoid (HCC)     Tobacco abuse     CKD (chronic kidney disease) stage 3, GFR 30-59 ml/min     Concussion     Cranial nerve VI palsy     Headache     S/P CABG (coronary artery bypass graft)     Screening for condition     GERD (gastroesophageal reflux disease)     Diabetes mellitus, type 2 ([facility]

## 2018-03-13 NOTE — TELEPHONE ENCOUNTER
Medication is on med list please review and address    Please address the medication refill and close the encounter. If I can be of assistance, please route to the applicable pool. Thank you. Next Visit Date:  No future appointments. Health Maintenance   Topic Date Due    Shingles Vaccine (1 of 2 - 2 Dose Series) 10/16/1998    Diabetic retinal exam  04/13/2016    Diabetic foot exam  11/25/2016    Colon Cancer Screen FIT/FOBT  10/18/2017    Lipid screen  09/20/2018    A1C test (Diabetic or Prediabetic)  10/30/2018    Potassium monitoring  01/10/2019    Creatinine monitoring  01/10/2019    Breast cancer screen  02/26/2020    DTaP/Tdap/Td vaccine (2 - Td) 08/25/2027    DEXA (modify frequency per FRAX score)  Completed    Flu vaccine  Completed    Pneumococcal low/med risk  Completed    Hepatitis C screen  Completed       Hemoglobin A1C (%)   Date Value   10/30/2017 5.7   10/07/2016 5.8   11/25/2015 5.6             ( goal A1C is < 7)   Microalb/Crt.  Ratio (mcg/mg creat)   Date Value   04/22/2016 19     LDL Cholesterol (mg/dL)   Date Value   09/20/2017 72       (goal LDL is <100)   AST (U/L)   Date Value   02/21/2013 16     ALT (U/L)   Date Value   02/21/2013 17     BUN (mg/dL)   Date Value   01/10/2018 15     BP Readings from Last 3 Encounters:   01/10/18 134/80   10/30/17 134/78   08/25/17 125/73          (goal 120/80)    All Future Testing planned in [facility]  Lab Frequency Next Occurrence   POCT Fecal Immunochemical Test (FIT) Once 10/10/2018               Patient Active Problem List:     Type 2 diabetes mellitus (HCC)     Asthma     CAD (coronary artery disease)     Hyperlipemia     HTN (hypertension)     Anemia     Schizophrenia, paranoid (HCC)     Tobacco abuse     CKD (chronic kidney disease) stage 3, GFR 30-59 ml/min     Concussion     Cranial nerve VI palsy     Headache     S/P CABG (coronary artery bypass graft)     Screening for condition     GERD (gastroesophageal reflux disease)

## 2018-07-06 NOTE — TELEPHONE ENCOUNTER
FORMER PT OF DR. Pauline Andres     Medication is on med list please review and address    Please address the medication refill and close the encounter. If I can be of assistance, please route to the applicable pool. Thank you. Next Visit Date:  No future appointments. Health Maintenance   Topic Date Due    Shingles Vaccine (1 of 2 - 2 Dose Series) 10/16/1998    Diabetic retinal exam  04/13/2016    Diabetic foot exam  11/25/2016    Flu vaccine (1) 09/01/2018    Lipid screen  09/20/2018    A1C test (Diabetic or Prediabetic)  10/30/2018    Potassium monitoring  01/10/2019    Creatinine monitoring  01/10/2019    Colon Cancer Screen FIT/FOBT  04/30/2019    Breast cancer screen  02/26/2020    DTaP/Tdap/Td vaccine (2 - Td) 08/25/2027    DEXA (modify frequency per FRAX score)  Completed    Pneumococcal low/med risk  Completed    Hepatitis C screen  Completed       Hemoglobin A1C (%)   Date Value   10/30/2017 5.7   10/07/2016 5.8   11/25/2015 5.6             ( goal A1C is < 7)   Microalb/Crt.  Ratio (mcg/mg creat)   Date Value   04/22/2016 19     LDL Cholesterol (mg/dL)   Date Value   09/20/2017 72       (goal LDL is <100)   AST (U/L)   Date Value   02/21/2013 16     ALT (U/L)   Date Value   02/21/2013 17     BUN (mg/dL)   Date Value   01/10/2018 15     BP Readings from Last 3 Encounters:   05/30/18 124/72   04/12/18 121/79   01/10/18 134/80          (goal 120/80)    All Future Testing planned in [facility]  Lab Frequency Next Occurrence   POCT Fecal Immunochemical Test (FIT) Once 10/10/2018               Patient Active Problem List:     Type 2 diabetes mellitus (HCC)     Asthma     CAD (coronary artery disease)     Hyperlipemia     HTN (hypertension)     Anemia     Schizophrenia, paranoid (HCC)     Tobacco abuse     CKD (chronic kidney disease) stage 3, GFR 30-59 ml/min     Concussion     Cranial nerve VI palsy     Headache     S/P CABG (coronary artery bypass graft)     Screening for condition     GERD

## 2018-07-27 NOTE — TELEPHONE ENCOUNTER
Next Visit Date:  No future appointments. Health Maintenance   Topic Date Due    Shingles Vaccine (1 of 2 - 2 Dose Series) 10/16/1998    Diabetic retinal exam  04/13/2016    Diabetic foot exam  11/25/2016    Flu vaccine (1) 09/01/2018    Lipid screen  09/20/2018    A1C test (Diabetic or Prediabetic)  10/30/2018    Potassium monitoring  01/10/2019    Creatinine monitoring  01/10/2019    Colon Cancer Screen FIT/FOBT  04/30/2019    Breast cancer screen  02/26/2020    DTaP/Tdap/Td vaccine (2 - Td) 08/25/2027    DEXA (modify frequency per FRAX score)  Completed    Pneumococcal low/med risk  Completed    Hepatitis C screen  Completed       Hemoglobin A1C (%)   Date Value   10/30/2017 5.7   10/07/2016 5.8   11/25/2015 5.6             ( goal A1C is < 7)   Microalb/Crt. Ratio (mcg/mg creat)   Date Value   04/22/2016 19     LDL Cholesterol (mg/dL)   Date Value   09/20/2017 72   10/15/2015 79       (goal LDL is <100)   AST (U/L)   Date Value   02/21/2013 16     ALT (U/L)   Date Value   02/21/2013 17     BUN (mg/dL)   Date Value   01/10/2018 15     BP Readings from Last 3 Encounters:   05/30/18 124/72   04/12/18 121/79   01/10/18 134/80          (goal 120/80)    All Future Testing planned in [facility]  Lab Frequency Next Occurrence   POCT Fecal Immunochemical Test (FIT) Once 10/10/2018               Patient Active Problem List:     Type 2 diabetes mellitus (HCC)     Asthma     CAD (coronary artery disease)     Hyperlipemia     HTN (hypertension)     Anemia     Schizophrenia, paranoid (HCC)     Tobacco abuse     CKD (chronic kidney disease) stage 3, GFR 30-59 ml/min     Concussion     Cranial nerve VI palsy     Headache     S/P CABG (coronary artery bypass graft)     Screening for condition     GERD (gastroesophageal reflux disease)     Diabetes mellitus, type 2 ([facility]           Please address the medication refill and close the encounter. If I can be of assistance, please route to the applicable pool.       Thank you.

## 2018-08-03 NOTE — TELEPHONE ENCOUNTER
Medication is on med list please review and address    Please address the medication refill and close the encounter. If I can be of assistance, please route to the applicable pool. Thank you. Next Visit Date:  Future Appointments  Date Time Provider Mikal Domingo   8/13/2018 10:15 AM Rajendra Nunes MD [address] [place] [address] Maintenance   Topic Date Due    Shingles Vaccine (1 of 2 - 2 Dose Series) 10/16/1998    Diabetic retinal exam  04/13/2016    Diabetic foot exam  11/25/2016    Flu vaccine (1) 09/01/2018    Lipid screen  09/20/2018    A1C test (Diabetic or Prediabetic)  10/30/2018    Potassium monitoring  01/10/2019    Creatinine monitoring  01/10/2019    Colon Cancer Screen FIT/FOBT  04/30/2019    Breast cancer screen  02/26/2020    DTaP/Tdap/Td vaccine (2 - Td) 08/25/2027    DEXA (modify frequency per FRAX score)  Completed    Pneumococcal low/med risk  Completed    Hepatitis C screen  Completed       Hemoglobin A1C (%)   Date Value   10/30/2017 5.7   10/07/2016 5.8   11/25/2015 5.6             ( goal A1C is < 7)   Microalb/Crt.  Ratio (mcg/mg creat)   Date Value   04/22/2016 19     LDL Cholesterol (mg/dL)   Date Value   09/20/2017 72   10/15/2015 79       (goal LDL is <100)   AST (U/L)   Date Value   02/21/2013 16     ALT (U/L)   Date Value   02/21/2013 17     BUN (mg/dL)   Date Value   01/10/2018 15     BP Readings from Last 3 Encounters:   05/30/18 124/72   04/12/18 121/79   01/10/18 134/80          (goal 120/80)    All Future Testing planned in [facility]  Lab Frequency Next Occurrence   POCT Fecal Immunochemical Test (FIT) Once 10/10/2018               Patient Active Problem List:     Type 2 diabetes mellitus (HCC)     Asthma     CAD (coronary artery disease)     Hyperlipemia     HTN (hypertension)     Anemia     Schizophrenia, paranoid (HCC)     Tobacco abuse     CKD (chronic kidney disease) stage 3, GFR 30-59 ml/min     Concussion     Cranial nerve VI palsy     Headache     S/P

## 2018-08-13 NOTE — PROGRESS NOTES
Visit Information    Have you changed or started any medications since your last visit including any over-the-counter medicines, vitamins, or herbal medicines? no   Have you stopped taking any of your medications? Is so, why? -  no  Are you having any side effects from any of your medications? - no    Have you seen any other physician or provider since your last visit? yes - cardiology   Have you had any other diagnostic tests since your last visit? yes - stress test   Have you been seen in the emergency room and/or had an admission in a hospital since we last saw you?  no   Have you had your routine dental cleaning in the past 6 months?  no     Do you have an active MyChart account? If no, what is the barrier?   No: inactive    Patient Care Team:  Chetna Yao MD as PCP - General (Family Medicine)  Joel Dick MD as Consulting Physician (Internal Medicine Cardiovascular Disease)    Medical History Review  Past Medical, Family, and Social History reviewed and does not contribute to the patient presenting condition    Health Maintenance   Topic Date Due    Shingles Vaccine (1 of 2 - 2 Dose Series) 10/16/1998    Diabetic retinal exam  04/13/2016    Diabetic foot exam  11/25/2016    Flu vaccine (1) 09/01/2018    Lipid screen  09/20/2018    A1C test (Diabetic or Prediabetic)  10/30/2018    Potassium monitoring  01/10/2019    Creatinine monitoring  01/10/2019    Colon Cancer Screen FIT/FOBT  04/30/2019    Breast cancer screen  02/26/2020    DTaP/Tdap/Td vaccine (2 - Td) 08/25/2027    DEXA (modify frequency per FRAX score)  Completed    Pneumococcal low/med risk  Completed    Hepatitis C screen  Completed

## 2018-08-13 NOTE — PROGRESS NOTES
Attending Physician Statement  I have discussed the care of Ji Novoa, including pertinent history and exam findings,  with the resident. I have reviewed the key elements of all parts of the encounter with the resident. I agree with the assessment, plan and orders as documented by the resident.   (GE Modifier)

## 2018-08-13 NOTE — PROGRESS NOTES
DAY  Dispense: 30 tablet; Refill: 2    2. Coronary artery disease with angina pectoris, unspecified vessel or lesion type, unspecified whether native or transplanted heart (HCC)  - isosorbide mononitrate (IMDUR) 30 MG extended release tablet; TAKE 1 TAB BY MOUTH ONCE A DAY  Dispense: 30 tablet; Refill: 2  - metoprolol (LOPRESSOR) 100 MG tablet; TAKE 1TABLET BY MOUTH TWICE A DAY  Dispense: 30 tablet; Refill: 2  - clopidogrel (PLAVIX) 75 MG tablet; TAKE 1 TAB BY MOUTH ONCE A DAY  Dispense: 30 tablet; Refill: 2  - aspirin EC 81 MG EC tablet; Take 1 tablet by mouth daily  Dispense: 30 tablet; Refill: 3    3. Chronic osteoarthritis  - Misc. Devices (ROLLATOR) MISC; 1 each by Does not apply route continuous  Dispense: 1 each; Refill: 0      Dennis received counseling on the following healthy behaviors: nutrition, exercise and medication adherence  Reviewed prior labs and health maintenance  Continue current medications, diet and exercise. Discussed use, benefit, and side effects of prescribed medications. Barriers to medication compliance addressed. Patient given educational materials - see patient instructions  Was a self-tracking handout given in paper form or via [employer]? No    Requested Prescriptions     Signed Prescriptions Disp Refills    Blood Pressure KIT 1 kit 0     Sig: Check BP once a day    Misc.  Devices (ROLLATOR) MISC 1 each 0     Si each by Does not apply route continuous    ferrous sulfate 325 (65 Fe) MG tablet 60 tablet 2     Sig: TAKE ONE TABLET BY MOUTH ONCE A DAY WITH BREAKFAST    amLODIPine (NORVASC) 5 MG tablet 30 tablet 2     Sig: TAKE 1 TAB BY MOUTH ONCE A DAY    isosorbide mononitrate (IMDUR) 30 MG extended release tablet 30 tablet 2     Sig: TAKE 1 TAB BY MOUTH ONCE A DAY    lisinopril (PRINIVIL;ZESTRIL) 5 MG tablet 60 tablet 2     Sig: TAKE 1 TAB BY MOUTH TWICE A DAY    metoprolol (LOPRESSOR) 100 MG tablet 30 tablet 2     Sig: TAKE 1\2 TABLET BY MOUTH TWICE A DAY    atorvastatin mononitrate (IMDUR) 30 MG extended release tablet 30 tablet 2     Sig: TAKE 1 TAB BY MOUTH ONCE A DAY    lisinopril (PRINIVIL;ZESTRIL) 5 MG tablet 60 tablet 2     Sig: TAKE 1 TAB BY MOUTH TWICE A DAY    metoprolol (LOPRESSOR) 100 MG tablet 30 tablet 2     Sig: TAKE 1\2 TABLET BY MOUTH TWICE A DAY    atorvastatin (LIPITOR) 40 MG tablet 30 tablet 2     Sig: TAKE 1 TAB BY MOUTH ONCE A DAY    clopidogrel (PLAVIX) 75 MG tablet 30 tablet 2     Sig: TAKE 1 TAB BY MOUTH ONCE A DAY    vitamin B-1 (THIAMINE) 100 MG tablet 30 tablet 2     Sig: TAKE 1 TAB BY MOUTH ONCE A DAY    omeprazole (PRILOSEC) 20 MG delayed release capsule 30 capsule 0     Sig: TAKE ONE CAPSULE BY MOUTH ONCE A DAY    aspirin EC 81 MG EC tablet 30 tablet 3     Sig: Take 1 tablet by mouth daily       Medications Discontinued During This Encounter   Medication Reason    aspirin 81 MG EC tablet Therapy completed    aspirin 81 MG EC tablet Therapy completed    ferrous sulfate 325 (65 Fe) MG tablet REORDER    amLODIPine (NORVASC) 5 MG tablet REORDER    isosorbide mononitrate (IMDUR) 30 MG extended release tablet REORDER    lisinopril (PRINIVIL;ZESTRIL) 5 MG tablet REORDER    metoprolol (LOPRESSOR) 100 MG tablet REORDER    atorvastatin (LIPITOR) 40 MG tablet REORDER    clopidogrel (PLAVIX) 75 MG tablet REORDER    vitamin B-1 (THIAMINE) 100 MG tablet REORDER    omeprazole (PRILOSEC) 20 MG delayed release capsule REORDER       Genet received counseling on the following healthy behaviors: nutrition, exercise and medication adherence    Discussed use, benefit, and side effects of prescribed medications. Barriers to medication compliance addressed. All patient questions answered. Pt voiced understanding. Return in about 3 months (around 11/13/2018) for HTN.

## 2018-09-06 NOTE — TELEPHONE ENCOUNTER
Last visit:  Last Med refill:    Next Visit Date:  Future Appointments  Date Time Provider Mikal Morani   11/16/2018 10:15 AM Milla Lira MD [address] [place] [address] Maintenance   Topic Date Due    Shingles Vaccine (1 of 2 - 2 Dose Series) 10/16/1998    Diabetic retinal exam  04/13/2016    Diabetic foot exam  11/25/2016    Flu vaccine (1) 09/01/2018    Lipid screen  09/20/2018    A1C test (Diabetic or Prediabetic)  10/30/2018    Potassium monitoring  01/10/2019    Creatinine monitoring  01/10/2019    Colon Cancer Screen FIT/FOBT  04/30/2019    Breast cancer screen  02/26/2020    DTaP/Tdap/Td vaccine (2 - Td) 08/25/2027    DEXA (modify frequency per FRAX score)  Completed    Pneumococcal low/med risk  Completed    Hepatitis C screen  Completed       Hemoglobin A1C (%)   Date Value   10/30/2017 5.7   10/07/2016 5.8   11/25/2015 5.6             ( goal A1C is < 7)   Microalb/Crt.  Ratio (mcg/mg creat)   Date Value   04/22/2016 19     LDL Cholesterol (mg/dL)   Date Value   09/20/2017 72   10/15/2015 79       (goal LDL is <100)   AST (U/L)   Date Value   02/21/2013 16     ALT (U/L)   Date Value   02/21/2013 17     BUN (mg/dL)   Date Value   01/10/2018 15     BP Readings from Last 3 Encounters:   08/13/18 130/82   05/30/18 124/72   04/12/18 121/79          (goal 120/80)    All Future Testing planned in [facility]  Lab Frequency Next Occurrence   POCT Fecal Immunochemical Test (FIT) Once 10/10/2018               Patient Active Problem List:     Type 2 diabetes mellitus (HCC)     Asthma     CAD (coronary artery disease)     Hyperlipemia     HTN (hypertension)     Anemia     Schizophrenia, paranoid (HCC)     Tobacco abuse     CKD (chronic kidney disease) stage 3, GFR 30-59 ml/min     Concussion     Cranial nerve VI palsy     Headache     S/P CABG (coronary artery bypass graft)     Screening for condition     GERD (gastroesophageal reflux disease)     Diabetes mellitus, type 2 ([facility]

## 2018-10-05 NOTE — TELEPHONE ENCOUNTER
Last visit:8/13/2018  Last Med refill:9/8/2018    Next Visit Date:  Future Appointments  Date Time Provider Mikal Chayo   11/16/2018 10:15 AM Micah Aquino MD [address] [place] [address] Maintenance   Topic Date Due    Shingles Vaccine (1 of 2 - 2 Dose Series) 10/16/1998    Diabetic retinal exam  04/13/2016    Diabetic foot exam  11/25/2016    Flu vaccine (1) 09/01/2018    Lipid screen  09/20/2018    A1C test (Diabetic or Prediabetic)  10/30/2018    Potassium monitoring  01/10/2019    Creatinine monitoring  01/10/2019    Colon Cancer Screen FIT/FOBT  04/30/2019    Breast cancer screen  02/26/2020    DTaP/Tdap/Td vaccine (2 - Td) 08/25/2027    DEXA (modify frequency per FRAX score)  Completed    Pneumococcal low/med risk  Completed    Hepatitis C screen  Completed       Hemoglobin A1C (%)   Date Value   10/30/2017 5.7   10/07/2016 5.8   11/25/2015 5.6             ( goal A1C is < 7)   Microalb/Crt.  Ratio (mcg/mg creat)   Date Value   04/22/2016 19     LDL Cholesterol (mg/dL)   Date Value   09/20/2017 72   10/15/2015 79       (goal LDL is <100)   AST (U/L)   Date Value   02/21/2013 16     ALT (U/L)   Date Value   02/21/2013 17     BUN (mg/dL)   Date Value   01/10/2018 15     BP Readings from Last 3 Encounters:   08/13/18 130/82   05/30/18 124/72   04/12/18 121/79          (goal 120/80)    All Future Testing planned in [facility]  Lab Frequency Next Occurrence   POCT Fecal Immunochemical Test (FIT) Once 10/10/2018               Patient Active Problem List:     Type 2 diabetes mellitus (HCC)     Asthma     CAD (coronary artery disease)     Hyperlipemia     HTN (hypertension)     Anemia     Schizophrenia, paranoid (HCC)     Tobacco abuse     CKD (chronic kidney disease) stage 3, GFR 30-59 ml/min (HCC)     Concussion     Cranial nerve VI palsy     Headache     S/P CABG (coronary artery bypass graft)     GERD (gastroesophageal reflux disease)     [facility]       Please address the

## 2018-10-05 NOTE — TELEPHONE ENCOUNTER
Last visit:8/13/2018  Last Med refill:5/13/2018    Next Visit Date:  Future Appointments  Date Time Provider Mikal Domingo   11/16/2018 10:15 AM Cony Dorsey MD [address] [place] [address] Maintenance   Topic Date Due    Shingles Vaccine (1 of 2 - 2 Dose Series) 10/16/1998    Diabetic retinal exam  04/13/2016    Diabetic foot exam  11/25/2016    Flu vaccine (1) 09/01/2018    Lipid screen  09/20/2018    A1C test (Diabetic or Prediabetic)  10/30/2018    Potassium monitoring  01/10/2019    Creatinine monitoring  01/10/2019    Colon Cancer Screen FIT/FOBT  04/30/2019    Breast cancer screen  02/26/2020    DTaP/Tdap/Td vaccine (2 - Td) 08/25/2027    DEXA (modify frequency per FRAX score)  Completed    Pneumococcal low/med risk  Completed    Hepatitis C screen  Completed       Hemoglobin A1C (%)   Date Value   10/30/2017 5.7   10/07/2016 5.8   11/25/2015 5.6             ( goal A1C is < 7)   Microalb/Crt.  Ratio (mcg/mg creat)   Date Value   04/22/2016 19     LDL Cholesterol (mg/dL)   Date Value   09/20/2017 72   10/15/2015 79       (goal LDL is <100)   AST (U/L)   Date Value   02/21/2013 16     ALT (U/L)   Date Value   02/21/2013 17     BUN (mg/dL)   Date Value   01/10/2018 15     BP Readings from Last 3 Encounters:   08/13/18 130/82   05/30/18 124/72   04/12/18 121/79          (goal 120/80)    All Future Testing planned in [facility]  Lab Frequency Next Occurrence   POCT Fecal Immunochemical Test (FIT) Once 10/10/2018               Patient Active Problem List:     Type 2 diabetes mellitus (HCC)     Asthma     CAD (coronary artery disease)     Hyperlipemia     HTN (hypertension)     Anemia     Schizophrenia, paranoid (HCC)     Tobacco abuse     CKD (chronic kidney disease) stage 3, GFR 30-59 ml/min (HCC)     Concussion     Cranial nerve VI palsy     Headache     S/P CABG (coronary artery bypass graft)     GERD (gastroesophageal reflux disease)     Di[facility]     Please address the

## 2018-11-16 NOTE — PROGRESS NOTES
History Narrative    No narrative on file       Objective:      /75 (Site: Right Upper Arm, Position: Sitting, Cuff Size: Large Adult)   Pulse 68   Ht 5' 6\" (1.676 m)   Wt 212 lb (96.2 kg)   BMI 34.22 kg/m²    BP Readings from Last 3 Encounters:   11/16/18 137/75   08/13/18 130/82   05/30/18 124/72       Physical Exam    General Appearance:  A&Ox3, NAD  Lungs:  Clear to auscultation B/L. Cardiovascular:  NL S1/S2, RRR, no m,g,r. Abdomen: + BS, Soft, nontender, nondistended, no masses or organomegaly  Neurologic: There are no new focal motor or sensory deficits  Skin: Intact, no bruising or bleeding on exposed skin area  Extremities: No cyanosis, clubbing or edema  Psych: NL affect     Assessment:     1. Essential hypertension    2. Coronary artery disease with angina pectoris, unspecified vessel or lesion type, unspecified whether native or transplanted heart ([facility]        Plan:      1. Essential hypertension  - amLODIPine (NORVASC) 5 MG tablet; TAKE 1 TAB BY MOUTH ONCE A DAY  Dispense: 30 tablet; Refill: 2  - lisinopril (PRINIVIL;ZESTRIL) 5 MG tablet; TAKE 1 TAB BY MOUTH TWICE A DAY  Dispense: 60 tablet; Refill: 2  - metoprolol (LOPRESSOR) 100 MG tablet; TAKE 1TABLET BY MOUTH TWICE A DAY  Dispense: 30 tablet; Refill: 2  - atorvastatin (LIPITOR) 40 MG tablet; TAKE 1 TAB BY MOUTH ONCE A DAY  Dispense: 30 tablet; Refill: 2     2. Coronary artery disease with angina pectoris, unspecified vessel or lesion type, unspecified whether native or transplanted heart ([facility])  - isosorbide mononitrate (IMDUR) 30 MG extended release tablet; TAKE 1 TAB BY MOUTH ONCE A DAY  Dispense: 30 tablet; Refill: 2  - metoprolol (LOPRESSOR) 100 MG tablet; TAKE 1TABLET BY MOUTH TWICE A DAY  Dispense: 30 tablet; Refill: 2  - clopidogrel (PLAVIX) 75 MG tablet; TAKE 1 TAB BY MOUTH ONCE A DAY  Dispense: 30 tablet; Refill: 2  - aspirin EC 81 MG EC tablet; Take 1 tablet by mouth daily  Dispense: 30 tablet;  Refill: 3      Genet received counseling on the following healthy behaviors: nutrition, exercise and medication adherence  Reviewed prior labs and health maintenance  Continue current medications, diet and exercise. Discussed use, benefit, and side effects of prescribed medications. Barriers to medication compliance addressed. Patient given educational materials - see patient instructions  Was a self-tracking handout given in paper form or via [employer]? No    Requested Prescriptions     Signed Prescriptions Disp Refills    lansoprazole (PREVACID) 15 MG delayed release capsule 30 capsule 0     Sig: TAKE ONE CAPSULE BY MOUTH ONCE A DAY    vitamin B-1 (THIAMINE) 100 MG tablet 30 tablet 0     Sig: TAKE 1 TAB BY MOUTH ONCE A DAY    amLODIPine (NORVASC) 5 MG tablet 30 tablet 2     Sig: TAKE 1 TAB BY MOUTH ONCE A DAY    isosorbide mononitrate (IMDUR) 30 MG extended release tablet 30 tablet 2     Sig: TAKE 1 TAB BY MOUTH ONCE A DAY    lisinopril (PRINIVIL;ZESTRIL) 5 MG tablet 60 tablet 2     Sig: TAKE 1 TAB BY MOUTH TWICE A DAY    metoprolol (LOPRESSOR) 100 MG tablet 30 tablet 2     Sig: TAKE 1\2 TABLET BY MOUTH TWICE A DAY    atorvastatin (LIPITOR) 40 MG tablet 30 tablet 2     Sig: TAKE 1 TAB BY MOUTH ONCE A DAY    clopidogrel (PLAVIX) 75 MG tablet 30 tablet 2     Sig: TAKE 1 TAB BY MOUTH ONCE A DAY    aspirin EC 81 MG EC tablet 30 tablet 3     Sig: Take 1 tablet by mouth daily       All patient questions answered. Patient voiced understanding. Quality Measures    Body mass index is 34.22 kg/m². Elevated. Weight control planned discussed Healthy diet and regular exercise. BP: 137/75. Blood pressure is normal. Treatment plan consists of No treatment change needed. Fall Risk 11/16/2018 8/25/2017 3/28/2016 6/6/2014   2 or more falls in past year? no no no no   Fall with injury in past year? no no no no     The patient does not have a history of falls. I did not - not indicated , complete a risk assessment for falls.  A plan of care

## 2018-12-02 PROBLEM — I46.9 CARDIAC ARREST (HCC): Status: ACTIVE | Noted: 2018-01-01

## 2018-12-02 NOTE — H&P
consulted. On EKG, they felt there was a new RBBB, and they felt she did not require cooling because it seemed she had purposeful movement on exam now. Her troponin was normal. Her WBC was elevated and her LA was 9.9. When the ICU team was called to evaluate the pt, she was only withdrawing to pain in the RUE. 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  Authorizing Provider   omeprazole (PRILOSEC) 20 MG delayed release capsule Take 20 mg by mouth 6/14/18   Historical Provider, MD   lansoprazole (PREVACID) 15 MG delayed release capsule TAKE ONE CAPSULE BY MOUTH ONCE A DAY 11/16/18   Mario Jones MD   vitamin B-1 (THIAMINE) 100 MG tablet TAKE 1 TAB BY MOUTH ONCE A DAY 11/16/18   Mario Jones MD   amLODIPine (NORVASC) 5 MG tablet TAKE 1 TAB BY MOUTH ONCE A DAY 11/16/18   Mario Jones MD   isosorbide mononitrate (IMDUR) 30 MG extended release tablet TAKE 1 TAB BY MOUTH ONCE A DAY 11/16/18   Mario Jones MD   lisinopril (PRINIVIL;ZESTRIL) 5 MG tablet TAKE 1 TAB BY MOUTH TWICE A DAY 11/16/18   Mario Jones MD   metoprolol (LOPRESSOR) 100 MG tablet TAKE 1\2 TABLET BY MOUTH TWICE A DAY 11/16/18   Mario Jones MD   atorvastatin (LIPITOR) 40 MG tablet TAKE 1 TAB BY MOUTH ONCE A DAY Labs      12/02/18   1502   CALCIUM  8.6     S. Ionized Calcium:No results for input(s): IONCA in the last 72 hours. S. Magnesium:  Recent Labs      12/02/18   1502   MG  2.4     S. Phosphorus:  Recent Labs      12/02/18   1502   PHOS  6.1*     S. Glucose:  Recent Labs      12/02/18   1443   POCGLU  242*     Glycosylated hemoglobin A1C: No results for input(s): LABA1C in the last 72 hours. INR:   Recent Labs      12/02/18   1502   INR  1.1     Hepatic functions:   Recent Labs      12/02/18   1502   ALKPHOS  91   ALT  55*   AST  59*   PROT  6.1*   BILITOT  0.25*   BILIDIR  0.10   LABALBU  3.2*     Pancreatic functions:No results for input(s): LACTA, AMYLASE in the last 72 hours. S. Lactic Acid: No results for input(s): LACTA in the last 72 hours. Cardiac enzymes:  Recent Labs      12/02/18   1441  12/02/18   1502   CKTOTAL   --   72   TROPONINI  0.04   --      BNP:No results for input(s): BNP in the last 72 hours. Lipid profile: No results for input(s): CHOL, TRIG, HDL, LDLCALC in the last 72 hours.     Invalid input(s): LDL  Blood Gases: No results found for: PH, PCO2, PO2, HCO3, O2SAT  Thyroid functions:   Lab Results   Component Value Date    TSH 1.60 11/12/2018        Urinalysis:     Microbiology:  Cultures during this admission:     Blood cultures:                 [] None drawn      [x] Negative             []  Positive (Details:  )  Urine Culture:                   [] None drawn      [x] Negative             []  Positive (Details:  )  Sputum Culture:               [x] None drawn       [] Negative             []  Positive (Details:  )   Endotracheal aspirate:     [x] None drawn       [] Negative             []  Positive (Details:  )         -----------------------------------------------------------------  Radiological reports:    Ct Head Wo Contrast    Result Date: 12/2/2018  EXAMINATION: CT OF THE HEAD WITHOUT CONTRAST  12/2/2018 3:15 pm TECHNIQUE: CT of the head was performed without the administration of

## 2018-12-02 NOTE — ED PROVIDER NOTES
resuscitative care, vent management, discussion with family. I performed a history and physical examination of the patient and discussed management with the resident. I reviewed the residents note and agree with the documented findings and plan of care. Any areas of disagreement are noted on the chart. I was personally present for the key portions of any procedures. I have documented in the chart those procedures where I was not present during the key portions. I have personally reviewed all images and agree with the resident's interpretation. I have reviewed the emergency nurses triage note. I agree with the chief complaint, past medical history, past surgical history, allergies, medications, social and family history as documented unless otherwise noted below. Documentation of the HPI, Physical Exam and Medical Decision Making performed by medical students or scribes is based on my personal performance of the HPI, PE and MDM. For Phys Assistant/ Nurse Practitioner cases/documentation I have had a face to face evaluation of this patient and have completed at least one if not all key elements of the E/M (history, physical exam, and MDM). Additional findings are as noted. For APC cases I have personally evaluated and examined the patient in conjunction with the APC and agree with the treatment plan and disposition of the patient as recorded by the APC.     Luis Randolph MD  Attending Emergency  Physician        Iam Lott MD  12/02/18 [id number]

## 2018-12-02 NOTE — ED PROVIDER NOTES
for vital signs    Constitutional: Well developed; well-nourished severe distress  HENT: Normocephalic, atraumatic  Eyes: DICKSON 3 mm reactive Conj nl  Neck: Not collared, trachea midline, no jvd  Cardiovascular:  No significant murmurs distal heart tones  Pulmonary/Chest Wall: diminished, clear to auscultate bilaterally without wheezes, rhonchi, rhales although there might be faint wheezes diffusely, CRUZ device in place  Abdomen: Soft, non-tender, non-distended  no pulsatile mass  Musculoskeletal:  No mottling or livedo reticularis, no edema   Neurological: 3t  Skin: cool and pale    DIFFERENTIAL  DIAGNOSIS     PLAN (LABS / IMAGING / EKG):  Orders Placed This Encounter   Procedures    Urine Culture    Culture Blood #1    Culture Blood #1    CT Chest Pulmonary Embolism W Contrast    CT Head WO Contrast    CT Head WO Contrast    XR CHEST PORTABLE    CBC Auto Differential    Hepatic Function Panel    Basic Metabolic Panel    TOX SCR, BLD, ED    Lactic Acid, Whole Blood    Ammonia    APTT    Protime-INR    Troponin    MAGNESIUM    Phosphorus    CK    Lipase    Urine Drug Screen    Urinalysis    Hemoglobin and hematocrit, blood    SODIUM (POC)    POTASSIUM (POC)    CHLORIDE (POC)    CALCIUM, IONIC (POC)    Blood gas, arterial    Beta-Hydroxybutyrate    Troponin    Microscopic Urinalysis    Lactic Acid, Whole Blood    SPECIMEN REJECTION    PREVIOUS SPECIMEN    Troponin    Place NG or Dobhoff Tube    Spontaneous Awakening Trial (SAT)    Inpatient consult to Cardiology    Inpatient consult to Critical Care    Respiratory care evaluation only    Initiate ED Aerosol Protocol    HHN Treatment    Spontaneous Breathing Trial (SBT)    Post Extubation Oxygen Therapy    Initiate RT Adult Mechanical Ventilation Protocol    Mechanical Ventilation with default initial settings    Pulse oximetry, continuous    ABG draw    End Tidal CO2 Continuous    POCT troponin    POC Blood Gas and are provided for review. Dose modulation, iterative reconstruction, and/or weight based adjustment of the mA/kV was utilized to reduce the radiation dose to as low as reasonably achievable. COMPARISON: None. HISTORY: ORDERING SYSTEM PROVIDED HISTORY: arrest, rbb, inferior changes FINDINGS: Pulmonary Arteries: Enlarged pulmonary artery without filling defect. Mediastinum: Cardiomegaly with heavy calcifications of the coronary arteries. No pericardial effusion. There appears to be thinning of the inferior wall of the lateral left ventricular wall near the apex possibly sequela of prior infarct. No pericardial effusion. Heavy calcifications of the ascending aorta. Lungs/pleura: Bilateral effusions and associated airspace disease, possibly atelectasis. Severe emphysematous changes with centrilobular configuration. No pneumothorax. No pleural effusion. ET tube terminates in good position. Enteric tube passes beneath the diaphragm. Upper Abdomen: Visualized portions of the upper abdomen are grossly unremarkable. Soft Tissues/Bones: No acute bone or soft tissue abnormality. 1. No evidence of pulmonary embolism within study limitations. 2. Enlarged pulmonary artery likely related to pulmonary hypertension in the setting of coexisting emphysema. 3. No focal airspace consolidation. 4. Heavy coronary calcifications and cardiomegaly. 5. Imaging features suspicious of old infarct of the inferior wall of left ventricle.        EKG  EKG Interpretation    Interpreted by emergency department physician    Rhythm: normal sinus   Rate: tachycardia  Axis: extreme 256  Ectopy: none  Conduction: right bundle branch block (complete), prwp  ST Segments: nonspecific changes  T Waves: non specific changes  Q Waves: anterior leads and inferior leads    Clinical Impression: non-specific EKG new rbb now meets criteria for prwp when compared to 10/11/2018    Marion Pick      All EKG's are interpreted by the Emergency Department received 1 L of IV fluid however upon reassessment she started to have crackles in the baselines, patient is a coronary artery disease with status post CABG, multiple stents without formal echo given that she doesn't have a source of infection will hold on giving more IV fluids. Patient never required IV pressors however they were having at Baseline due to concern that patient may become hypotensive after epinephrine wears off    Lactic acid improved down to 2.4, troponin is normal, patient has a right bundle branch block, she would benefit from echo    Patient will need antibiotics for COPD exacerbation was given Decadron, albuterol, Atrovent. Do not think the patient needs antibiotics for aspiration given that is too early for aspiration pneumonia, no need for antibiotics for aspiration prophylaxis. PROCEDURES:  Please see Dr. Stout Jojo procedure note    CONSULTS:  Gisela CHAKRABORTY CONSULT TO CRITICAL CARE      CRITICAL CARE:  CRITICAL CARE: There was a high probability of clinically significant/life threatening deterioration in this patient's condition which required my urgent intervention. Total critical care time was 35 minutes. This excludes any time for separately reportable procedures. FINAL IMPRESSION      1. Cardiac arrest ([address]    2. COPD exacerbation (HCC)    3. Elevated lactic acid level    4. Acute kidney injury by akin criteria    5. Serum phosphate elevated    6.  Acute respiratory failure with hypoxia and hypercapnia ([address]          DISPOSITION / PLAN     DISPOSITION Admitted    PATIENT REFERRED TO:  Bryant Pack MD  [address] [place] [address]  [phone]            DISCHARGE MEDICATIONS:  New Prescriptions    No medications on file       Amrik Fine MD  Emergency Medicine Resident    (Please note that portions of this note were completed with a voice recognition program.  Efforts were made to edit the dictations but occasionally words are

## 2018-12-03 NOTE — PROGRESS NOTES
Patient admitted on Mechanical Ventilator Protocol. Patients height measured at 67\" for an IBW 61.6    Patient placed on the ventilator on settings as charted on flowsheeet. Ventilator Bronchodilator assessment    Breath sounds: clear  Inspiratory Pressure: 32  Plateau Pressure: 30    Patient assessed at level 3            [x]    Bronchodilator Assessment    BRONCHODILATOR ASSESSMENT SCORE  Score 0 (Home) 1 2 3 4   Breath Sounds   []  Chronic Ventilator: Patient at baseline [x]  Mild Wheezes/ Clear []  Intermittent wheezes with good air entry []  Bilateral/unilateral wheezing with diminished air entry []  Insp/Exp wheeze and/or poor aeration   Ventilator Pressures   []  Chronic Ventilator []  Insp. Pressure less than 25 cm H20 []  Insp. Pressure less than 25 cm H20 []  Insp. Pressure exceeds 25 cm H20 [x]  Insp.  Pressure exceeds 30 cm H20   Plateau Pressure []  NA   [x]  Plateau Pressure less than 4  []  Plateau Pressure less than or equal to 5 []  Plateau Pressure greater than or equal to 6 []  Plateau Pressure greater than or equal to 8       [facility]  9:11 PM

## 2018-12-03 NOTE — PROCEDURES
Honorio Diaz MD        0.9 % sodium chloride infusion   Intravenous Continuous Tyrese Granda  mL/hr at 12/03/18 1439      heparin (porcine) injection 4,000 Units  4,000 Units Intravenous PRN Tyrese Granda MD        heparin (porcine) injection 2,000 Units  2,000 Units Intravenous PRN Tyrese Granda MD   2,000 Units at 12/03/18 1456    heparin 25,000 units in dextrose 5% 250 mL infusion  10.1 Units/kg/hr Intravenous Continuous Tyrese Granda MD 6.1 mL/hr at 12/03/18 0649 6.1 Units/kg/hr at 12/03/18 0649    albuterol (PROVENTIL) nebulizer solution 2.5 mg  2.5 mg Nebulization Q6H PRN Aren Newberry MD        albuterol (PROVENTIL) nebulizer solution 2.5 mg  2.5 mg Nebulization BID Elian Rivas MD   2.5 mg at 12/03/18 0352    fentaNYL (SUBLIMAZE) injection 50 mcg  50 mcg Intravenous Q1H PRN Tyrese Granda MD        Or    fentaNYL (SUBLIMAZE) injection 100 mcg  100 mcg Intravenous Q1H PRN Tyrese Granda MD   100 mcg at 12/03/18 1200    methylPREDNISolone sodium (SOLU-MEDROL) injection 40 mg  40 mg Intravenous Q8H Tyrese Granda MD   40 mg at 12/03/18 0630    cisatracurium besylate (NIMBEX) 200 mg in sodium chloride 0.9 % 100 mL infusion  2 mcg/kg/min Intravenous Continuous Tyrese Granda MD 4.5 mL/hr at 12/03/18 1450 1.5 mcg/kg/min at 12/03/18 1450        Technical Description: This is a 21 channel digital EEG recording with time-locked video. Electrodes were placed in accordance with the 10-20 International System of Electrode Placement. Single lead EKG monitoring as well as temporal electrodes were included. The patient was not sleep deprived. This recording was obtained during obtunded state. EEG Description: The background activity consisted of low amplitude 1-2 Hz of delta frequency. Frequent bursts of sharply contoured 6-7 Hz of theta frequency was seen over the right and independently over the left hemisphere lasting for 3-4 seconds.   The background otherwise

## 2018-12-03 NOTE — PROGRESS NOTES
2246: Writer RRT at patient's bedside to evaluate for decreasing SPO2. Current settings 100%, PEEP 16. SPO2 84%. Patient spontaneous RR low 30s  Suctioned patient for small white secretions. No improvement in SPO2. Breath sounds clear. ETT in correct positioning at 24 @ lip. 2248: Increase PEEP to 18. No improvement in SPO2. Critical care resident at bedside. 2255: Critical care resident called Dr. Antwon Dorsey to notify of patients respiratory status declining. Writer spoke to Dr. Antwon Dorsey on the phone and explained patients current vent settings and most recent ABG. 2204: Verbal order received from Dr. Antwon Dorsey to increase RR from 18 to 22. Repeat chest xray. RN to give 1 dose of Norcuron, then writer RRT to draw ABG 30 mins after med is given. 2355: ABG drawn and results reported to resident. 7.30/ 45/ 87/ 22.4    2357: SPO2 98%. Decreased PEEP from 18 to 16.    0022: SpO2 97%. Decreased PEEP from 16 to 14. Will continue to wean settings as tolerated.

## 2018-12-03 NOTE — PROGRESS NOTES
Pharmacy Note     Renal Dose Adjustment    Eliana Godinez is a 77 y.o. female. Pharmacist assessment of renally cleared medications. Recent Labs      12/02/18 2026 12/03/18   0444   BUN  20  21       Recent Labs      12/02/18 2026 12/03/18   0444   CREATININE  1.60*  1.88*       Estimated Creatinine Clearance: 33 mL/min (A) (based on SCr of 1.88 mg/dL (H)).   Estimated CrCl using Ideal Body Weight: 26 mL/min (based on IBW 59.3 kg)    Height:   Ht Readings from Last 1 Encounters:   12/02/18 5' 6.14\" (1.68 m)     Weight:  Wt Readings from Last 1 Encounters:   12/02/18 219 lb 12.8 oz (99.7 kg)       The following medication dose has been adjusted based upon renal function per P&T Guidelines:             Pepcid BID to daily  Unasyn Q6H to Q12H    Shantelle Fairbanks PharmD BCPS  12/3/2018 1:07 PM

## 2018-12-03 NOTE — PROGRESS NOTES
Smoking Cessation - topics covered   []  Health Risks  []  Benefits of Quitting   []  Smoking Cessation  []  Patient has no history of tobacco use  [x]  Patient is former smoker. Patient quit in 2013. [x]  No need for tobacco cessation education. []  Booklet given  []  Patient verbalizes understanding. []  Patient denies need for tobacco cessation education.   Mejia Anderson  9:14 AM

## 2018-12-03 NOTE — CONSULTS
[facility])             [facility] Consult        Date of Admission:  12/2/2018  Date of Consultation:  12/3/2018      PCP:  Miriam Aponte MD      Chief Complaint:arrest  History of Present Illness:  Martinez Guerrero is a 77 y.o. female  With prior CABG, normal LV function, cath in 2016 showed 4/4 patent grafts, severe COPD  Has respiratory distress and then PEA arrest. She had CPR and then ROSC. The ER felt she had purposeful movement and thus they choose not to proceed with ICE protocol. There was no arrhythmia requiring shock/defibrillation. Non specific bump in troponin. EKG shows RBBB. In addition, echo is pending. PMH:   has a past medical history of Acute kidney failure, unspecified ([facility] Asthma; CAD (coronary artery disease); Chronic kidney disease, stage III (moderate) ([facility] [address] [facility] Cranial nerve VI palsy; GERD (gastroesophageal reflux disease); Headache(784.0); Hyperlipidemia; Hypertension; Tobacco abuse; and Type II or unspecified type diabetes mellitus without mention of complication, not stated as uncontrolled. PSH:   has a past surgical history that includes Coronary artery bypass graft; Coronary angioplasty with stent (2004); and Cardiac catheterization (07/12/2016). Allergies:  No Known Allergies     Home Meds:    Prior to Admission medications    Medication Sig Start Date End Date Taking?  Authorizing Provider   omeprazole (PRILOSEC) 20 MG delayed release capsule Take 20 mg by mouth 6/14/18   Historical Provider, MD   lansoprazole (PREVACID) 15 MG delayed release capsule TAKE ONE CAPSULE BY MOUTH ONCE A DAY 11/16/18   Miriam Aponte MD   vitamin B-1 (THIAMINE) 100 MG tablet TAKE 1 TAB BY MOUTH ONCE A DAY 11/16/18   Miriam Aponte MD   amLODIPine (NORVASC) 5 MG tablet TAKE 1 TAB BY MOUTH ONCE A DAY 11/16/18   Miriam Aponte MD   isosorbide mononitrate (IMDUR) 30 MG extended release tablet TAKE 1 TAB BY MOUTH ONCE A DAY 11/16/18   Miriam Aponte MD   lisinopril ampicillin-sulbactam (UNASYN) 1.5 g IVPB minibag  1.5 g Intravenous Maribell Montes MD   Stopped at 12/03/18 0931    albuterol (PROVENTIL) nebulizer solution 2.5 mg  2.5 mg Nebulization Q6H PRN Aren Resendiz MD        albuterol (PROVENTIL) nebulizer solution 2.5 mg  2.5 mg Nebulization BID Julian Chun MD   2.5 mg at 12/03/18 0352    fentaNYL (SUBLIMAZE) injection 50 mcg  50 mcg Intravenous Q1H PRN Philippe Bone MD        Or    fentaNYL (SUBLIMAZE) injection 100 mcg  100 mcg Intravenous Q1H PRN Khushboo Pham MD   100 mcg at 12/03/18 0303    methylPREDNISolone sodium (SOLU-MEDROL) injection 40 mg  40 mg Intravenous Q8H Khushboo Pham MD   40 mg at 12/03/18 0630    cisatracurium besylate (NIMBEX) 200 mg in sodium chloride 0.9 % 100 mL infusion  2 mcg/kg/min Intravenous Continuous Khushboo Pham MD 3 mL/hr at 12/03/18 2707 1 mcg/kg/min at 12/03/18 8626       Social History:       Social History     Social History    Marital status: Single     Spouse name: N/A    Number of children: N/A    Years of education: N/A     Social History Main Topics    Smoking status: Former Smoker     Packs/day: 1.00     Years: 20.00     Quit date: 2/5/2013    Smokeless tobacco: Never Used    Alcohol use No    Drug use: No    Sexual activity: Not Asked     Other Topics Concern    None     Social History Narrative    None         Family Histroy:              History reviewed. No pertinent family history. Review of Systems:   · Patient intubated and sedated     Physical Exam   Vital Signs: BP (!) 159/64   Pulse 101   Temp 99.3 °F (37.4 °C) (Core)   Resp 22   Ht 5' 6.14\" (1.68 m)   Wt 219 lb 12.8 oz (99.7 kg)   SpO2 93%   BMI 35.32 kg/m²        Admission Weight: 220 lb (99.8 kg)     General appearance: intubated and sedated    Skin:  Warm and Dry to touch    Head: Normocephalic, without obvious abnormality, atraumatic    Eyes: Conjunctivae unremarkable, EOM's intact.     Neck: No JVD, No

## 2018-12-03 NOTE — CONSULTS
PLACEMENT      CORONARY ARTERY BYPASS GRAFT       Current Medications:   Current Facility-Administered Medications: propofol 1000 MG/100ML injection, 10 mcg/kg/min, Intravenous, Titrated  sodium chloride flush 0.9 % injection 10 mL, 10 mL, Intravenous, 2 times per day  sodium chloride flush 0.9 % injection 10 mL, 10 mL, Intravenous, PRN  magnesium hydroxide (MILK OF MAGNESIA) 400 MG/5ML suspension 30 mL, 30 mL, Oral, Daily PRN  ondansetron (ZOFRAN) injection 4 mg, 4 mg, Intravenous, Q6H PRN  ipratropium-albuterol (DUONEB) nebulizer solution 1 ampule, 1 ampule, Inhalation, 4x daily  acetaminophen (TYLENOL) suppository 650 mg, 650 mg, Rectal, Q4H PRN  famotidine (PEPCID) injection 20 mg, 20 mg, Intravenous, BID  0.9 % sodium chloride infusion, , Intravenous, Continuous  heparin (porcine) injection 4,000 Units, 4,000 Units, Intravenous, PRN  heparin (porcine) injection 2,000 Units, 2,000 Units, Intravenous, PRN  heparin 25,000 units in dextrose 5% 250 mL infusion, 10.1 Units/kg/hr, Intravenous, Continuous  ampicillin-sulbactam (UNASYN) 1.5 g IVPB minibag, 1.5 g, Intravenous, Q6H  albuterol (PROVENTIL) nebulizer solution 2.5 mg, 2.5 mg, Nebulization, Q6H PRN  [START ON 12/3/2018] albuterol (PROVENTIL) nebulizer solution 2.5 mg, 2.5 mg, Nebulization, BID  fentaNYL (SUBLIMAZE) injection 50 mcg, 50 mcg, Intravenous, Q1H PRN **OR** fentaNYL (SUBLIMAZE) injection 100 mcg, 100 mcg, Intravenous, Q1H PRN  Allergies:  Patient has no known allergies. Social History:  TOBACCO:   reports that she quit smoking about 5 years ago. She has a 20.00 pack-year smoking history. She has never used smokeless tobacco.  ETOH:   reports that she does not drink alcohol. DRUGS:   reports that she does not use drugs. ACTIVITIES OF DAILY LIVING:  Patient is able to perform all activities of daily living. INSTRUMENTAL ACTIVITIES OF DAILY LIVING:  Patient is able to perform all instrumental activities of daily living.     Family History: Value Ref Range    POC Lactic Acid 7.94 (H) 0.56 - 1.39 mmol/L   POCT Glucose    Collection Time: 12/02/18  2:43 PM   Result Value Ref Range    POC Glucose 242 (H) 74 - 100 mg/dL   Anion Gap (Calc) POC    Collection Time: 12/02/18  2:43 PM   Result Value Ref Range    Anion Gap 11 7 - 16 mmol/L   CBC Auto Differential    Collection Time: 12/02/18  3:02 PM   Result Value Ref Range    WBC 14.1 (H) 3.5 - 11.3 k/uL    RBC 4.20 3.95 - 5.11 m/uL    Hemoglobin 12.1 11.9 - 15.1 g/dL    Hematocrit 42.6 36.3 - 47.1 %    .4 82.6 - 102.9 fL    MCH 28.8 25.2 - 33.5 pg    MCHC 28.4 28.4 - 34.8 g/dL    RDW 14.6 (H) 11.8 - 14.4 %    Platelets 061 417 - 761 k/uL    MPV 9.9 8.1 - 13.5 fL    NRBC Automated 4.7 (H) 0.0 per 100 WBC    Differential Type NOT REPORTED     WBC Morphology NOT REPORTED     RBC Morphology NOT REPORTED     Platelet Estimate NOT REPORTED     Immature Granulocytes 1 (H) 0 %    Seg Neutrophils 65 36 - 66 %    Lymphocytes 22 (L) 24 - 44 %    Monocytes 12 (H) 1 - 7 %    Eosinophils % 0 (L) 1 - 4 %    Basophils 0 0 - 2 %    nRBC 4 (H) 0 per 100 WBC    Absolute Immature Granulocyte 0.14 0.00 - 0.30 k/uL    Segs Absolute 9.17 (H) 1.8 - 7.7 k/uL    Absolute Lymph # 3.10 1.0 - 4.8 k/uL    Absolute Mono # 1.69 (H) 0.1 - 0.8 k/uL    Absolute Eos # 0.00 0.0 - 0.4 k/uL    Basophils # 0.00 0.0 - 0.2 k/uL    Morphology 1+    Hepatic Function Panel    Collection Time: 12/02/18  3:02 PM   Result Value Ref Range    Alb 3.2 (L) 3.5 - 5.2 g/dL    Alkaline Phosphatase 91 35 - 104 U/L    ALT 55 (H) 5 - 33 U/L    AST 59 (H) <32 U/L    Total Bilirubin 0.25 (L) 0.3 - 1.2 mg/dL    Bilirubin, Direct 0.10 <0.31 mg/dL    Bilirubin, Indirect 0.15 0.00 - 1.00 mg/dL    Total Protein 6.1 (L) 6.4 - 8.3 g/dL    Globulin NOT REPORTED 1.5 - 3.8 g/dL    Albumin/Globulin Ratio 1.1 1.0 - 2.5   Basic Metabolic Panel    Collection Time: 12/02/18  3:02 PM   Result Value Ref Range    Glucose 282 (H) 70 - 99 mg/dL    BUN 17 8 - 23 mg/dL    CREATININE Hgb MODERATE (A) NEG    pH, UA 6.5 5.0 - 8.0    Protein, UA 3+ (A) NEG    Urobilinogen, Urine Normal NORM    Nitrite, Urine NEGATIVE NEG    Leukocyte Esterase, Urine NEGATIVE NEG    Urinalysis Comments NOT REPORTED    Microscopic Urinalysis    Collection Time: 12/02/18  4:31 PM   Result Value Ref Range    -          WBC, UA None 0 - 5 /HPF    RBC, UA 5 TO 10 0 - 2 /HPF    Casts UA NOT REPORTED 0 - 2 /LPF    Crystals UA NOT REPORTED NONE /HPF    Epithelial Cells UA 0 TO 2 0 - 5 /HPF    Renal Epithelial, Urine NOT REPORTED 0 /HPF    Bacteria, UA MODERATE (A) NONE    Mucus, UA 1+ (A) NONE    Trichomonas, UA NOT REPORTED NONE    Amorphous, UA NOT REPORTED NONE    Other Observations UA NOT REPORTED NREQ    Yeast, UA NOT REPORTED NONE   SPECIMEN REJECTION    Collection Time: 12/02/18  5:24 PM   Result Value Ref Range    Specimen Source . BLOOD     Ordered Test TROPI     Reason for Rejection       Unable to perform testing: Specimen quantity not sufficient.    - NOT REPORTED    Lactic Acid, Whole Blood    Collection Time: 12/02/18  5:37 PM   Result Value Ref Range    Lactic Acid, Whole Blood 2.4 (H) 0.7 - 2.1 mmol/L   Troponin    Collection Time: 12/02/18  6:07 PM   Result Value Ref Range    Troponin T 0.13 (HH) <0.03 ng/mL    Troponin Interp         LACTIC ACID, WHOLE BLOOD    Collection Time: 12/02/18  8:26 PM   Result Value Ref Range    Lactic Acid, Whole Blood 3.8 (H) 0.7 - 2.1 mmol/L   Basic Metabolic Panel w/ Reflex to MG    Collection Time: 12/02/18  8:26 PM   Result Value Ref Range    Glucose 200 (H) 70 - 99 mg/dL    BUN 20 8 - 23 mg/dL    CREATININE 1.60 (H) 0.50 - 0.90 mg/dL    Bun/Cre Ratio NOT REPORTED 9 - 20    Calcium 8.8 8.6 - 10.4 mg/dL    Sodium 147 (H) 135 - 144 mmol/L    Potassium 3.6 (L) 3.7 - 5.3 mmol/L    Chloride 111 (H) 98 - 107 mmol/L    CO2 19 (L) 20 - 31 mmol/L    Anion Gap 17 9 - 17 mmol/L    GFR Non-African American 32 (L) >60 mL/min    GFR  39 (L) >60 mL/min    GFR Comment

## 2018-12-03 NOTE — PROGRESS NOTES
Culture:  No components found for: CURINE LFGNRs  Blood Culture:  No components found for: CBLOOD, CFUNGUSBL  Sputum Culture:  No components found for: CSPUTUM      Other Labs:  None      Radiology/Imaging:  Ct Head Wo Contrast    Result Date: 12/2/2018  EXAMINATION: CT OF THE HEAD WITHOUT CONTRAST  12/2/2018 5:28 pm TECHNIQUE: CT of the head was performed without the administration of intravenous contrast. Dose modulation, iterative reconstruction, and/or weight based adjustment of the mA/kV was utilized to reduce the radiation dose to as low as reasonably achievable. COMPARISON: 12/02/2018. HISTORY: ORDERING SYSTEM PROVIDED HISTORY: mental status changes TECHNOLOGIST PROVIDED HISTORY: FINDINGS: BRAIN/VENTRICLES: There is no acute intracranial hemorrhage, mass effect or midline shift. No abnormal extra-axial fluid collection. The gray-white differentiation is maintained without evidence of an acute infarct. There is no evidence of hydrocephalus. There is low-attenuation within the periventricular white matter. There is contrast within the vascular system. No abnormal areas of enhancement are seen. ORBITS: The visualized portion of the orbits demonstrate no acute abnormality. SINUSES: The visualized paranasal sinuses and mastoid air cells demonstrate no acute abnormality. SOFT TISSUES/SKULL:  No acute abnormality of the visualized skull or soft tissues. 1. No acute intracranial abnormality. 2. Chronic small vessel ischemic disease. Ct Head Wo Contrast    Result Date: 12/2/2018  EXAMINATION: CT OF THE HEAD WITHOUT CONTRAST  12/2/2018 3:15 pm TECHNIQUE: CT of the head was performed without the administration of intravenous contrast. Dose modulation, iterative reconstruction, and/or weight based adjustment of the mA/kV was utilized to reduce the radiation dose to as low as reasonably achievable. COMPARISON: None.  HISTORY: ORDERING SYSTEM PROVIDED HISTORY: post cardiac arrest TECHNOLOGIST PROVIDED HISTORY: 8:21 AM       Attending Physician Statement  I have discussed the care of Chayo Seal, including pertinent history and exam findings with the resident. I have reviewed the key elements of all parts of the encounter with the resident. I have seen and examined the patient with the resident. I agree with the assessment and plan and status of the problem list as documented. I seen the patient during round this morning, have reviewed the labs, ventilator setting and arterial blood gas is seen and multiple chest x-ray seen. Since she was brought in to ICU from emergency room she had not follows command and apparently there was some involuntary extremities posturing noted but she was not off sedation for long time and he started having tachypnea and increased airway trapping become hypoxic and required increased PEEP from 10-14 overnight remains on 100% FiO2 overnight, she desaturated to mid 70s saturation and despite being on PEEP of 18 her saturation was not improved until she received Norcuron after she was paralyzed her oxygen saturation improved and I:E ratio improved and then her oxygen saturation improved. She was started on Nimbex drip early this morning because of inability to control ventilation and airway trapping with maximum  Patient had PEA arrest and possibly respiratory arrest/respiratory failure leading to PEA arrest but difficult to determine. dose of sedation with propofol  This morning she was on FIo2 70%, , rate 22, PEEP 12  and arterial blood gas showed Ph 7.31/44/71/92  On decreasing the Nimbex drip and sedation her saturation decreased to 85% requiring 100% FiO2 again and a PEEP of 14  Chest x-ray this morning shows bilateral lower lobe infiltrate and atelectasis is no pneumothorax seen. She has also increased creatinine this morning with creatinine of 1.8. Sodium is 146 and her lactic acid was 3.2 this morning.   Urine output is almost 400 since admitted last night  We will get

## 2018-12-04 NOTE — PROGRESS NOTES
Nutrition Assessment (Enteral Nutrition)    Type and Reason for Visit: Reassess, Consult (TF ordering/management )    Nutrition Recommendations: Start Tube Feeding -suggest using Vital HP goal 35 mL/hr while on propofol at current rate. Will monitor TF tolerance/adequacy. Nutrition Assessment: Pt remains on vent. Order to start TF today- consulted for management. Malnutrition Assessment:  · Malnutrition Status: Insufficient data    Nutrition Risk Level: High    Nutrition Needs:  · Estimated Daily Total Kcal: 5173-3392 kcal/day   · Estimated Daily Protein (g):  g pro/day     Nutrition Diagnosis:   · Problem: Inadequate oral intake  · Etiology: related to Impaired respiratory function-inability to consume food     Signs and symptoms:  as evidenced by NPO status due to medical condition    Objective Information:  · Current Nutrition Therapies:  · Oral Diet Orders: NPO   · Tube Feeding (TF) Orders: Standard with Fiber ordered to start. RD consulted for management.    · Additional Calories: Propofol at 26.9 ml/hr =710 kcal/day  · Anthropometric Measures:  · Ht: 5' 6.14\" (168 cm)   · Current Body Wt: 228 lb 13.4 oz (103.8 kg)  · Admission Body Wt: 219 lb 12.8 oz (99.7 kg)  · Ideal Body Wt: 130 lb (59 kg), % Ideal Body 138% (adm/ideal)  · BMI Classification: BMI 35.0 - 39.9 Obese Class II    Nutrition Interventions:   Start Tube Feeding (Vital HP goal 35 mL/hr while on propofol at current rate.)  Continued Inpatient Monitoring, Education Not Indicated    Nutrition Evaluation:   · Evaluation: Progressing toward goals   · Goals: meet greater than 75% of estimated nutrition needs    · Monitoring: TF Intake, TF Tolerance, Monitor Bowel Function, Weight, Pertinent Labs, I&O      Electronically signed by Rossana Aguirre RD, LD on 12/4/18 at 3:31 PM    Contact Number: [phone]

## 2018-12-04 NOTE — CONSULTS
enjoys family, attending [employer], reading her Bible and watching TV programs. Writer explained the supportive role of palliative care. During my visit, patient's daughter Barry Agustin arrived to bedside as well. Family with questions regarding EEG results and breathing status. EEG impression discussed with son and daughter. Son does not remember any conversations regarding patient's wishes regarding intubation or CPR. Son thinks patient may have a living will and is going to check with siblings; he is aware of some planning for financial matters. Empathy and emotional support provided. Education/support to family  Education/support to patient  Providing support for coping/adaptation/distress of family    Principle Problem/Diagnosis:  Cardiac arrest [facility]) [I46.9]  Cardiac arrest ([facility] [address] [I46.9]    Goals of care evaluation:  The patient goals of care are improve or maintain function/quality of life, remain at home and support for family/caregiver   Goals of care discussed with:    [] Patient independently    [] Patient and Family    [x] Family or Healthcare DPOA independently    [] Unable to discuss with patient, family/DPOA not present    Code Status  Full Code    Other recommendations:  Please call with any palliative questions or concerns. Palliative Care Team is available via perfect serve or via phone - [phone]. Palliative Care will continue to follow Ms. De La Cruz's care as needed. Thank you for allowing Palliative Care to participate in the care of Ms. Jcarlos Barr .     Electronically signed by   Malika Frey RN  Palliative Care Team  on 12/4/2018 at 11:34 AM    Palliative care office: [phone]

## 2018-12-04 NOTE — PROGRESS NOTES
Critical Care Interval Progress Note:  Notified by nursing staff that patient was hypertensive and tachycardic. I evaluated patient and when turning the ventilator rate down to 16, patient was triggering her own breaths. Therefore we increased Nimbex to 2.0.     10:07 PM After ~ 30 minutes after increasing Nimbex, she is still tachycardic and hypertesnive. I turned vent down to 16 RR and she is still initiating breaths. Nursing is going to go up on Propofol.          Yovany Payne MD  Critical Care Resident PGY-3

## 2018-12-05 NOTE — PROGRESS NOTES
HCT  38.7  36.5  36.1*   MCV  96.0  95.5  98.1   MCH  28.8  28.8  28.5   MCHC  30.0  30.1  29.1   RDW  14.8*  15.1*  15.5*   PLT  307  278  271   MPV  10.0  9.9  9.9        Last 3 Blood Glucose:   Recent Labs      12/02/18   1502  12/02/18 2026  12/03/18   0444  12/04/18 0428  12/05/18   0406   GLUCOSE  282*  200*  205*  169*  160*        PT/INR:    Lab Results   Component Value Date    PROTIME 11.5 12/02/2018    INR 1.1 12/02/2018     PTT:    Lab Results   Component Value Date    APTT 63.4 12/04/2018       Comprehensive Metabolic Profile:   Recent Labs      12/02/18   1502   12/03/18 0444 12/04/18 0428 12/05/18   0406   NA  147*   < >  146*  149*  142   K  3.6*   < >  3.6*  3.8  4.0   CL  109*   < >  112*  116*  111*   CO2  16*   < >  17*  19*  18*   BUN  17   < >  21  25*  28*   CREATININE  1.39*   < >  1.88*  1.91*  2.27*   GLUCOSE  282*   < >  205*  169*  160*   CALCIUM  8.6   < >  8.2*  8.0*  7.8*   PROT  6.1*   --    --    --    --    LABALBU  3.2*   --    --    --    --    BILITOT  0.25*   --    --    --    --    ALKPHOS  91   --    --    --    --    AST  59*   --    --    --    --    ALT  55*   --    --    --    --     < > = values in this interval not displayed.       Magnesium:   Lab Results   Component Value Date    MG 1.7 12/05/2018    MG 1.7 12/04/2018    MG 1.7 12/03/2018     Phosphorus:   Lab Results   Component Value Date    PHOS 5.2 12/05/2018    PHOS 5.9 12/04/2018    PHOS 5.4 12/03/2018     Ionized Calcium:   Lab Results   Component Value Date    CAION 1.13 12/05/2018    CAION 1.10 12/04/2018    CAION 1.07 12/03/2018        Urinalysis: Lab Results   Component Value Date    NITRU NEGATIVE 12/02/2018    COLORU YELLOW 12/02/2018    PHUR 6.5 12/02/2018    WBCUA None 12/02/2018    RBCUA 5 TO 10 12/02/2018    MUCUS 1+ 12/02/2018    TRICHOMONAS NOT REPORTED 12/02/2018    YEAST NOT REPORTED 12/02/2018    BACTERIA MODERATE 12/02/2018    SPECGRAV 1.025 12/02/2018    LEUKOCYTESUR NEGATIVE

## 2018-12-05 NOTE — PROGRESS NOTES
most likely respiratory etiology  2. Respiratory failure, currently on vent  3. Prior ASCVD - cath 2016 4/4 patent grafts  4. Normal LV function,   5. Severe pulmonary HTN and Severe RV dilitation  5.  CKD, Cr rising    Supportive care, no new cardiac recs    Electronically signed by Kim Veras MD on 12/5/2018 at 2:16 PM

## 2018-12-05 NOTE — PROGRESS NOTES
PALLIATIVE CARE TEAM    Patient: Martinez Guerrero  Room: 8819/6427-67    Reason For Consult   Goals of care evaluation  Distress management  Guidance and support  Facilitate communications  Assistance in coordinating care  Recommendations for the above    Impression: Martinez Guerrero is a 77y.o. year old female with  has a past medical history of Acute kidney failure, unspecified ([address] Asthma; CAD (coronary artery disease); Centrilobular emphysema ([address] Chronic kidney disease, stage III (moderate) ([address] Cranial nerve VI palsy; GERD (gastroesophageal reflux disease); Headache(784.0); Hyperlipidemia; Hypertension; Tobacco abuse; and Type II or unspecified type diabetes mellitus without mention of complication, not stated as uncontrolled. .  Currently hospitalized for the management of respiratory and PEA arrest.  The Palliative Care Team is following to assist with goals of care and family support. Code Status  Full Code    Vital Signs:  BP (!) 130/55   Pulse 92   Temp 98.8 °F (37.1 °C) (Core)   Resp 22   Ht 5' 6.14\" (1.68 m)   Wt 236 lb 15.9 oz (107.5 kg)   SpO2 95%   BMI 38.09 kg/m²     Patient Active Problem List   Diagnosis    Type 2 diabetes mellitus (HCC)    Asthma    CAD (coronary artery disease)    Hyperlipemia    HTN (hypertension)    Anemia    Schizophrenia, paranoid ([address]    Tobacco abuse    CKD (chronic kidney disease) stage 3, GFR 30-59 ml/min (HCC)    Concussion    Cranial nerve VI palsy    Headache    S/P CABG (coronary artery bypass graft)    GERD (gastroesophageal reflux disease)    Diabetes mellitus, type 2 (HCC)    Card[facility] arrest [facility]    Acute encephalopathy    Acute respiratory failure with hypoxia and hypercapnia (HCC)    Centrilobular emphysema (HCC)    Lactic acidosis    TANGELA (acute kidney injury) ([address]    Leukocytosis    Aspiration pneumonia of right lower lobe ([address]       Palliative Interaction:  Chart reviewed and update received from bedside RN.   Patient

## 2018-12-06 NOTE — PROGRESS NOTES
 S/P CABG (coronary artery bypass graft) 06/06/2013    Concussion 04/04/2013    Cranial nerve VI palsy 04/04/2013    Headache 04/04/2013    CKD (chronic kidney disease) stage 3, GFR 30-59 ml/min ([facility]) 10/25/2012    Tobacco abuse 04/24/2012    Type 2 diabetes mellitus ([email or web address] 04/01/2011    Asthma 04/01/2011    CAD (coronary artery disease) 04/01/2011    Hyperlipemia 04/01/2011    HTN (hypertension) 04/01/2011    Anemia 04/01/2011    Schizophrenia, paranoid ([email or web address] 04/01/2011          PLAN:     WEAN PER PROTOCOL:  [x] No   [] Yes  [] N/A    ICU PROPHYLAXIS:  Stress ulcer:  [] PPI Agent  [x] [id number] [] Sucralfate  [] Other:  VTE:   [] Enoxaparin  [x] Unfract. Heparin Subcut  [] EPC Cuffs    NUTRITION:  [] NPO [x] Tube Feeding (Specify: ) [] TPN  [] PO    HOME MEDS RECONCILED: [] No  [x] Yes    CONSULTATION NEEDED:  [] No  [x] Yes    FAMILY UPDATED:    [] No  [x] Yes    TRANSFER OUT OF ICU:   [x] No  [] Yes        Additional assessment:  · 78 yo female s/p PEA arrest secondary to acute respiratory failure, intubated, limited neuro response off sedation, E.  Coli growth from urine culture, being treated with unasyn for UTI and suspected aspiration.     Plan:  Neuro:  - Neuro exams per protocol, once off sedation and paralysis  - Cont prop gtt and nimbex gtt for sedation and paralysis for now  - Tylenol PRN  - Fentanyl PRN  - Neuro C/S appreciate recs  - Brain MRI once more stable, EEG with signs of severe diffuse encephalopathy and anoxic brain injury     Cards:  - Cards consult, appreciate recs  - Monitor hemodynamics closely  - ECHO: mild LV hypertrophy, global LV systolic function hyperdynamic, est EF > 65%, G1 mild DD, RA severely dilated, RV dilatation, trivial AI, mild MR, mod TR  - Troponin elevated, now normalized, was on hep gtt, d/c'd yesterday, now just on ppx heparin     Resp:  - Intubated  - Trend ABGs  - Suction as needed  - Maintain O2 sats > 88%  - Resp treatments, duonebs and albuterol  - Wean

## 2018-12-07 NOTE — FLOWSHEET NOTE
[facility] [address]   Patient Death Note  DEATH   Shift date: 2018    Shift day: Thursday  Shift #: 3                 Room # 0118/0118-01   Name: Ritika William            Age: 77 y.o. Gender: female          [employer]: [address] [place] [address] of [employer]: [employer]  Admit Date & Time: 2018  2:32 PM     Referral: Code Blue/Unit Nurse Call   Actual date of death: 2018   TOD: 65       SITUATION AT DEATH:  Patient \"coded\" and received several rounds of \"chest compressions,\" with family present outside room. Per Dr. Benja Franklin, patient's family changed code status to \"DNR-CCA\" and patient  at 65. IS THIS A 'S CASE? No    SPIRITUAL/EMOTIONAL INTERVENTION:   responded to nurse call indicating that patient was \"coding. \" Nunu Robert met two of patient's children outside room. Patient's children were not receptive to 's offer of support and declined needs at time of code.  remained present on unit throughout code and code status change.  learned of patient's death and returned to patient's family members at bedside. Patient's family members were tearful and receptive of 's condolences.  presented grief packet to family. Patient's family requested patient be made presentable for visitation.  made request to bedside nurse and family moved to hallway while patient was being prepared by nursing staff.  will prepare sympathy card for family, upon contact information being gathered.  informed bedside nurse of referral to next shift . Family Received Grief Packet? Yes       DOCTOR SIGNING DEATH NOTE: Dr. Lew Kellogg spoke with [facility] Nurse   Angie Pagan , who will contact the  home    Copy of COMPLETED Release of Body Form Received? yes     HOME:  [facility]  [phone]    NEXT OF KIN:  Olga Lidia Good, daughter  [address]

## 2018-12-07 NOTE — PROGRESS NOTES
ICU DEATH NOTE    PATIENT NAME: Martinez Guerrero  YOB: 1950  MEDICAL RECORD NO. [id number]  DATE: 12/7/2018  PRIMARY CARE PHYSICIAN: Miriam Aponte MD    DIAGNOSIS OF DEATH     I have confirmed the death of this patient in accordance with accepted medical standards.   The patient is dead as evidenced by cardiac death or cessation of brain function:    Cardiac Death (check all that apply):     []  Absence of respiratory effort by observation     [x]  Absence of pulse by palpation     []  Absence of blood pressure by sphygmomanometry     [x]  Absence of sustainable cardiac rhythm by monitor    Death by Cessation of Brain Function (check all that apply):     []  Absence of Cerebral Function with no motor response     []  Absence of brain stem function by systemic physical exam     []  Failure to respond with respiratory drive by apnea test     []  Cerebral Electrical silence as interpreted by qualified reader        OR     []  Absence of brain blood flow by radiologic technique      CERTIFICATION OF DEATH     I have pronounced the patient dead on:     Date: 12/7/2018 at 6:22 AM    NOTIFICATIONS     Attending physician that will sign Death Certificate: Dr Frankie Ling notified: Name and/or Relationship: Discussed with family at bedside                                                          []  Per Nursing     notified (Name):                                                           [x]  Per Nursing      Mikala Adames, [facility] Resident,   Department of Internal Medicine/ Critical care  [facility])   12/7/2018, 6:34 AM

## 2018-12-07 NOTE — FLOWSHEET NOTE
SPIRITUAL CARE DEPARTMENT - [address]  PROGRESS NOTE    Shift date: 2018  Shift day: Friday   Shift # 1    Room # 0118/0118-01   Name: Ana Watson            Age: 77 y.o. Gender: female          [employer]: [employer]     Referral: follow up grief care for family following death of patient    SPIRITUAL ASSESSMENT/INTERVENTION:   arrived to find several family members waiting in the hallway and spoke with several. Oldest Daughter Jayant Letters [phone] asked about \"next steps\".  informed her of need for final arrangements and release of body form. Family members included several children and grandchildren, many of whom were tearful and comforting and supporting one another. Family shared stories of patient's life and their experiences with her. Family  arrived and spent time with patient and family. Family selected  home, Jade Perez - see death note), and Joaquín Mitchell signed release of body form.  provided grief packet, words of comfort, and will send condolence card.      SPIRITUAL CARE FOLLOW-UP PLAN:  None now - family departing hospital.    Electronically signed by Santos Barger on 2018 at 8:38 AM.  [facility]  [phone]

## 2018-12-08 LAB
CULTURE: NORMAL
CULTURE: NORMAL
Lab: NORMAL
Lab: NORMAL
SPECIMEN DESCRIPTION: NORMAL
SPECIMEN DESCRIPTION: NORMAL
STATUS: NORMAL
STATUS: NORMAL

## 2023-12-20 NOTE — PLAN OF CARE
Problem: OXYGENATION/RESPIRATORY FUNCTION  Goal: Patient will achieve/maintain normal respiratory rate/effort  Respiratory rate and effort will be within normal limits for the patient   Outcome: Not Met This Shift      Problem: SKIN INTEGRITY  Goal: Skin integrity is maintained or improved  Outcome: Met This Shift      Problem: Nutrition  Goal: Optimal nutrition therapy  Outcome: Ongoing
Problem: OXYGENATION/RESPIRATORY FUNCTION  Goal: Patient will maintain patent airway  Outcome: Ongoing    Goal: Patient will achieve/maintain normal respiratory rate/effort  Respiratory rate and effort will be within normal limits for the patient   Outcome: Ongoing      Problem: MECHANICAL VENTILATION  Goal: Patient will maintain patent airway  Outcome: Ongoing    Goal: Oral health is maintained or improved  Outcome: Ongoing    Goal: ET tube will be managed safely  Outcome: Ongoing    Goal: Ability to express needs and understand communication  Outcome: Ongoing    Goal: Mobility/activity is maintained at optimum level for patient  Outcome: Ongoing
Problem: OXYGENATION/RESPIRATORY FUNCTION  Goal: Patient will maintain patent airway  Outcome: Ongoing    Goal: Patient will achieve/maintain normal respiratory rate/effort  Respiratory rate and effort will be within normal limits for the patient   Outcome: Ongoing      Problem: MECHANICAL VENTILATION  Goal: Patient will maintain patent airway  Outcome: Ongoing    Goal: Oral health is maintained or improved  Outcome: Ongoing    Goal: ET tube will be managed safely  Outcome: Ongoing    Goal: Ability to express needs and understand communication  Outcome: Ongoing    Goal: Mobility/activity is maintained at optimum level for patient  Outcome: Ongoing      Problem: SKIN INTEGRITY  Goal: Skin integrity is maintained or improved  Outcome: Ongoing
Yes